# Patient Record
Sex: FEMALE | Race: WHITE | NOT HISPANIC OR LATINO | Employment: FULL TIME | URBAN - NONMETROPOLITAN AREA
[De-identification: names, ages, dates, MRNs, and addresses within clinical notes are randomized per-mention and may not be internally consistent; named-entity substitution may affect disease eponyms.]

---

## 2021-10-26 ENCOUNTER — OFFICE VISIT (OUTPATIENT)
Dept: FAMILY MEDICINE CLINIC | Facility: CLINIC | Age: 36
End: 2021-10-26

## 2021-10-26 ENCOUNTER — LAB (OUTPATIENT)
Dept: LAB | Facility: HOSPITAL | Age: 36
End: 2021-10-26

## 2021-10-26 VITALS
WEIGHT: 226 LBS | RESPIRATION RATE: 20 BRPM | HEART RATE: 69 BPM | DIASTOLIC BLOOD PRESSURE: 85 MMHG | HEIGHT: 63 IN | SYSTOLIC BLOOD PRESSURE: 138 MMHG | TEMPERATURE: 97.6 F | BODY MASS INDEX: 40.04 KG/M2

## 2021-10-26 DIAGNOSIS — F90.0 ATTENTION DEFICIT HYPERACTIVITY DISORDER (ADHD), PREDOMINANTLY INATTENTIVE TYPE: ICD-10-CM

## 2021-10-26 DIAGNOSIS — F90.0 ATTENTION DEFICIT HYPERACTIVITY DISORDER (ADHD), PREDOMINANTLY INATTENTIVE TYPE: Primary | ICD-10-CM

## 2021-10-26 LAB
AMPHET+METHAMPHET UR QL: NEGATIVE
AMPHETAMINES UR QL: NEGATIVE
BARBITURATES UR QL SCN: NEGATIVE
BENZODIAZ UR QL SCN: NEGATIVE
BUPRENORPHINE SERPL-MCNC: NEGATIVE NG/ML
CANNABINOIDS SERPL QL: NEGATIVE
COCAINE UR QL: NEGATIVE
METHADONE UR QL SCN: NEGATIVE
OPIATES UR QL: NEGATIVE
OXYCODONE UR QL SCN: NEGATIVE
PCP UR QL SCN: NEGATIVE
PROPOXYPH UR QL: NEGATIVE
TRICYCLICS UR QL SCN: NEGATIVE

## 2021-10-26 PROCEDURE — 99204 OFFICE O/P NEW MOD 45 MIN: CPT | Performed by: PEDIATRICS

## 2021-10-26 PROCEDURE — 80306 DRUG TEST PRSMV INSTRMNT: CPT

## 2021-10-26 RX ORDER — SPIRONOLACTONE 50 MG/1
50 TABLET, FILM COATED ORAL 2 TIMES DAILY
COMMUNITY

## 2021-10-26 NOTE — ASSESSMENT & PLAN NOTE
Evaluation packet reviewed and discussed with patient.  Patients symptoms are impairing her work performance, self-esteem and ability to have positive family kinetics.  We will start new medication pending negative UDS.  Lance reviewed and is clean.  Follow up in 1 month.Psychological condition is newly identified.  Medication changes per orders.  Psychological condition  will be reassessed in 4 weeks.    After negative uds vyvanse 40 will be started.    UDS negative.

## 2021-10-26 NOTE — PROGRESS NOTES
"Chief Complaint  ADHD (initial)    Subjective    History of Present Illness      Patient presents to [facility] for   Pt states that she is here for an initial ADHD evaluation.       ADHD/Mood HPI    Visit for:  initial evaluation  Interim changes to follow up on today: no change in medication  Work/School Performance:  struggling  Cognitive:  unable to focus    Behavior  Hyperactivity: is not hyperactive  Impulsivity: no impulsivity  Tasking: difficulty initiating tasks and difficulty completing tasks    Social  ADHD social/impulsive symptoms:  has difficulty awaiting turn    Behavioral health  Behavior: no concerns  Emotional coping: demonstrates feelings of no concerns       Review of Systems    I have reviewed and agree with the HPI information as above.  Dae Malcolm MD     Objective   Vital Signs:   /85   Pulse 69   Temp 97.6 °F (36.4 °C)   Resp 20   Ht 160 cm (63\")   Wt 103 kg (226 lb)   BMI 40.03 kg/m²       Physical Exam  Constitutional:       Appearance: Normal appearance. She is obese.   Cardiovascular:      Rate and Rhythm: Normal rate and regular rhythm.      Heart sounds: Normal heart sounds.   Pulmonary:      Effort: Pulmonary effort is normal.      Breath sounds: Normal breath sounds.   Neurological:      Mental Status: She is alert.   Psychiatric:         Mood and Affect: Mood and affect normal.         Behavior: Behavior normal.         Thought Content: Thought content normal.         Cognition and Memory: Cognition normal.          Result Review  Data Reviewed:        Urine Drug Screen - Urine, Clean Catch (10/26/2021 11:45) - Negative UDS             Assessment and Plan      Problem List Items Addressed This Visit        Mental Health    Attention deficit hyperactivity disorder (ADHD), predominantly inattentive type - Primary    Current Assessment & Plan     Evaluation packet reviewed and discussed with patient.  Patients symptoms are impairing her work " performance, self-esteem and ability to have positive family kinetics.  We will start new medication pending negative UDS.  Lance reviewed and is clean.  Follow up in 1 month.Psychological condition is newly identified.  Medication changes per orders.  Psychological condition  will be reassessed in 4 weeks.    After negative uds vyvanse 40 will be started.    UDS negative.         Relevant Orders    Urine Drug Screen - Urine, Clean Catch (Completed)              Follow Up   Return in about 4 weeks (around 11/23/2021) for Recheck.  Patient was given instructions and counseling regarding her condition or for health maintenance advice. Please see specific information pulled into the AVS if appropriate.

## 2021-11-22 ENCOUNTER — OFFICE VISIT (OUTPATIENT)
Dept: FAMILY MEDICINE CLINIC | Facility: CLINIC | Age: 36
End: 2021-11-22

## 2021-11-22 VITALS — RESPIRATION RATE: 20 BRPM | TEMPERATURE: 97.7 F | BODY MASS INDEX: 39.34 KG/M2 | HEIGHT: 63 IN | WEIGHT: 222 LBS

## 2021-11-22 DIAGNOSIS — F90.0 ATTENTION DEFICIT HYPERACTIVITY DISORDER (ADHD), PREDOMINANTLY INATTENTIVE TYPE: Primary | ICD-10-CM

## 2021-11-22 PROCEDURE — 99214 OFFICE O/P EST MOD 30 MIN: CPT | Performed by: NURSE PRACTITIONER

## 2021-11-22 NOTE — PROGRESS NOTES
"Chief Complaint  f/u ADHD    Subjective    History of Present Illness      Patient presents to [facility] for   Pt states that she is here for a f/u with ADHD and says the medication is wearing off early and would like to discuss increasing the Vyvanse.       ADHD/Mood HPI    Visit for:  follow-up. Most recent visit was 1 month ago.  Interim changes to follow up on today: medication dose change  Work/School Performance:  going well  Cognitive:  able to focus    Behavior  Hyperactivity: is not hyperactive  Impulsivity: no impulsivity  Tasking: able to mult-task    Social  ADHD social/impulsive symptoms:  not impatient    Behavioral health  Behavior: no concerns  Emotional coping: demonstrates feelings of no concerns         I have reviewed and agree with the HPI information as above.  Nu Zendejas, APRN     Objective   Vital Signs:   Temp 97.7 °F (36.5 °C)   Resp 20   Ht 160 cm (63\")   Wt 101 kg (222 lb)   BMI 39.33 kg/m²       Physical Exam  Vitals and nursing note reviewed.   Constitutional:       Appearance: Normal appearance. She is well-developed. She is obese.   HENT:      Head: Normocephalic and atraumatic.      Right Ear: Tympanic membrane, ear canal and external ear normal.      Left Ear: Tympanic membrane, ear canal and external ear normal.      Nose: Nose normal. No septal deviation, nasal tenderness or congestion.      Mouth/Throat:      Lips: Pink. No lesions.      Mouth: Mucous membranes are moist. No oral lesions.      Dentition: Normal dentition.      Pharynx: Oropharynx is clear. No pharyngeal swelling, oropharyngeal exudate or posterior oropharyngeal erythema.   Eyes:      General: Lids are normal. Vision grossly intact. No scleral icterus.        Right eye: No discharge.         Left eye: No discharge.      Extraocular Movements: Extraocular movements intact.      Conjunctiva/sclera: Conjunctivae normal.      Right eye: Right conjunctiva is not injected. "      Left eye: Left conjunctiva is not injected.      Pupils: Pupils are equal, round, and reactive to light.   Neck:      Thyroid: No thyroid mass.      Trachea: Trachea normal.   Cardiovascular:      Rate and Rhythm: Normal rate and regular rhythm.      Heart sounds: Normal heart sounds. No murmur heard.  No gallop.    Pulmonary:      Effort: Pulmonary effort is normal.      Breath sounds: Normal breath sounds and air entry. No wheezing, rhonchi or rales.   Musculoskeletal:         General: No tenderness or deformity. Normal range of motion.      Cervical back: Full passive range of motion without pain, normal range of motion and neck supple.      Thoracic back: Normal.      Right lower leg: No edema.      Left lower leg: No edema.   Skin:     General: Skin is warm and dry.      Coloration: Skin is not jaundiced.      Findings: No rash.   Neurological:      Mental Status: She is alert and oriented to person, place, and time.      Cranial Nerves: Cranial nerves are intact.      Sensory: Sensation is intact.      Motor: Motor function is intact.      Coordination: Coordination is intact.      Gait: Gait is intact.      Deep Tendon Reflexes: Reflexes are normal and symmetric.   Psychiatric:         Mood and Affect: Mood and affect normal.         Behavior: Behavior normal.         Judgment: Judgment normal.                 Assessment and Plan      Problem List Items Addressed This Visit        Mental Health    Attention deficit hyperactivity disorder (ADHD), predominantly inattentive type - Primary      Patient states that she can tell a positive difference but feels like it could be a little bit stronger. UDS is UTD and appropriate. Will increase the dose at this time.   Plan:  1. Increase vyvanse to 50 mg        Follow Up   Return in about 1 month (around 12/22/2021) for ADHD follow up.  Patient was given instructions and counseling regarding her condition or for health maintenance advice. Please see specific  information pulled into the AVS if appropriate.

## 2022-05-02 ENCOUNTER — TELEPHONE (OUTPATIENT)
Dept: BARIATRICS/WEIGHT MGMT | Facility: CLINIC | Age: 37
End: 2022-05-02

## 2022-05-02 NOTE — TELEPHONE ENCOUNTER
LVM to remind them of their new patient appointment and to bring completed paperwork or arrive 30 minutes early. Please call back with any questions

## 2022-05-03 ENCOUNTER — OFFICE VISIT (OUTPATIENT)
Dept: BARIATRICS/WEIGHT MGMT | Facility: CLINIC | Age: 37
End: 2022-05-03

## 2022-05-03 VITALS
SYSTOLIC BLOOD PRESSURE: 126 MMHG | HEIGHT: 63 IN | WEIGHT: 231.2 LBS | TEMPERATURE: 98.9 F | HEART RATE: 78 BPM | DIASTOLIC BLOOD PRESSURE: 86 MMHG | BODY MASS INDEX: 40.96 KG/M2 | OXYGEN SATURATION: 98 %

## 2022-05-03 DIAGNOSIS — E78.2 MIXED HYPERLIPIDEMIA: ICD-10-CM

## 2022-05-03 DIAGNOSIS — R06.09 DYSPNEA ON EXERTION: ICD-10-CM

## 2022-05-03 DIAGNOSIS — E66.01 CLASS 3 SEVERE OBESITY DUE TO EXCESS CALORIES WITH SERIOUS COMORBIDITY AND BODY MASS INDEX (BMI) OF 40.0 TO 44.9 IN ADULT: Primary | ICD-10-CM

## 2022-05-03 PROCEDURE — 99215 OFFICE O/P EST HI 40 MIN: CPT | Performed by: NURSE PRACTITIONER

## 2022-05-03 RX ORDER — HYDROCHLOROTHIAZIDE 25 MG/1
25 TABLET ORAL DAILY
COMMUNITY

## 2022-05-03 NOTE — PROGRESS NOTES
Patient Care Team:  Ryan Sams DO as PCP - General (Family Medicine)      Subjective     Patient is a 36 y.o. female presents with morbid obesity and her Body mass index is 41.28 kg/m².     She is here for discussion of surgical weight loss options.  She stated she has been with the disease of obesity for year(s).  She stated she suffers from pre diabetes, hyperlipidemia and morbid obesity due to her weight gain.  She stated that weight loss helps alleviate these symptoms.   She stated that she has tried other diet regimens and medications such as phentermine, diuretics and dexatrim to help with weight loss.  She stated that she has attempted these conservative methods for weight loss without maintaining long term success.  Today she would like to discuss surgical weight loss options such as the Laparoscopic Sleeve Gastrectomy or the Laparoscopic R - Y Gastric Bypass.     States that she is dealt with being overweight her whole life.  She states that she started dieting at age 13 and over the years she has been able to lose 20 pounds on a single diet and maintain it for 2 years.    Review of Systems   Constitutional: Positive for fatigue and unexpected weight gain.   Respiratory: Negative.    Cardiovascular: Negative.    Gastrointestinal: Negative.    Endocrine: Negative.    Genitourinary: Positive for frequency.   Musculoskeletal: Positive for joint swelling.   Psychiatric/Behavioral: Positive for sleep disturbance.        History  Past Medical History:   Diagnosis Date   • ADHD (attention deficit hyperactivity disorder)       Past Surgical History:   Procedure Laterality Date   •  SECTION      x 2      Social History     Socioeconomic History   • Marital status:    Tobacco Use   • Smoking status: Former Smoker     Quit date: 5/3/2002     Years since quittin.0   • Smokeless tobacco: Never Used   Vaping Use   • Vaping Use: Never used   Substance and Sexual Activity   • Alcohol use:  Never   • Drug use: Never   • Sexual activity: Defer      Family History   Problem Relation Age of Onset   • Obesity Mother    • Hypertension Mother    • Diabetes Mother    • Hypertension Father    • Sleep apnea Maternal Grandmother    • Heart disease Maternal Grandmother    • Hypertension Maternal Grandmother    • Cancer Maternal Grandfather    • Hypertension Paternal Grandfather    • Heart disease Paternal Grandfather    • Diabetes Paternal Grandfather       No Known Allergies       Current Outpatient Medications:   •  hydroCHLOROthiazide (HYDRODIURIL) 25 MG tablet, Take 25 mg by mouth Daily., Disp: , Rfl:   •  lisdexamfetamine (Vyvanse) 50 MG capsule, Take 1 capsule by mouth Every Morning, Disp: 30 capsule, Rfl: 0  •  spironolactone (ALDACTONE) 50 MG tablet, Take 50 mg by mouth 2 (Two) Times a Day., Disp: , Rfl:     Objective     Vital Signs  Temp:  [98.9 °F (37.2 °C)] 98.9 °F (37.2 °C)  Heart Rate:  [78] 78  BP: (126)/(86) 126/86  Body mass index is 41.28 kg/m².      05/03/22  1106   Weight: 105 kg (231 lb 3.2 oz)       Physical Exam  Vitals reviewed.   Constitutional:       Appearance: She is obese.   Cardiovascular:      Rate and Rhythm: Normal rate and regular rhythm.   Pulmonary:      Effort: Pulmonary effort is normal.   Abdominal:      General: Bowel sounds are normal.      Palpations: Abdomen is soft.   Musculoskeletal:         General: Normal range of motion.   Skin:     General: Skin is warm and dry.   Neurological:      Mental Status: She is alert and oriented to person, place, and time.   Psychiatric:         Mood and Affect: Mood normal.         Behavior: Behavior normal.            Results Review:   I reviewed the patient's new clinical results.      Class 3 Severe Obesity (BMI >=40). Obesity-related health conditions include the following: dyslipidemias. Obesity is unchanged. BMI is is above average; BMI management plan is completed. We discussed portion control and increasing  exercise.      Assessment/Plan   Diagnoses and all orders for this visit:    1. Class 3 severe obesity due to excess calories with serious comorbidity and body mass index (BMI) of 40.0 to 44.9 in adult (HCC) (Primary)  Assessment & Plan:  Patient's (Body mass index is 41.28 kg/m².) indicates that they are morbidly obese (BMI > 40 or > 35 with obesity - related health condition) with health conditions that include dyslipidemias . Weight is worsening. BMI is is above average; BMI management plan is completed. We discussed portion control, increasing exercise and consulting a Bariatric surgeon.     Orders:  -     Bariatric Nutritional Counseling  -     ECG 12 Lead; Future  -     XR Chest 2 View; Future  -     Bariatric Nutritional Counseling    2. Dyspnea on exertion  Comments:  Patient states that she has noticed this worsens with weight gain.  Chest x-ray and EKG ordered today.  Orders:  -     ECG 12 Lead; Future  -     XR Chest 2 View; Future    3. Mixed hyperlipidemia  Comments:  Not on medication, states she is working on diet to control cholesterol level.       She has been provided a structured dietary regimen based off of her behavior.  I discussed with the patient the etiology of the disease of obesity and the potential comorbid conditions associated with this disease.  She was instructed to follow the dietary regimen and follow-up with our program in 1 month's time with any additional questions as they may arise during this time.  We emphasized on focusing on proteins and meals high in fiber as well as adequate hydration that exceed 64 ounces of water daily.    I explained that I anticipate the patient to lose weight prior to her next monthly visit.  I encouraged patient to have a reward day once a month.    I discussed the patient's findings and my recommendations with patient.     I have also recommended that she obtain a cardiac risk assessment and psychiatric evaluation prior to surgery  consideration.    1. Patient is a 36 y.o. female who has morbid obesity with Body mass index is 41.28 kg/m². and desires surgical weight loss. Patient has been advised that this surgery is considered to be elective major surgery that is typically done laparoscopically. Patient is a potentially good surgical candidate. Patient will need to meet with the Bariatric Surgeon to further discuss surgical options. Patient has been advised that they will need to have a work-up prior to surgery. This work-up will include but is not limited to an EKG, an EGD to assess for H. Pylori and Celestin's esophagus, and a psychological evaluation, with additional testing as necessary. Pre-op testing will be ordered at next visit. Today the patient  received the 4 meals/day diet prescription, which was explained to patient.  Patient will see the dietitian today to further discuss goals for diet, exercise, and lifestyle. Patient has received intensive behavioral therapy for obesity today. I explained the pathophysiology of the disease and its storagte component. We also discussed Dr. Stevenson' pearls of the program. Nutrition counseling ordered today.     2. Current comorbid condition of  hyperlipidemia associated with her morbid obesity is reported to be stable on her current treatment regimen and medications. We anticipate the comorbid condition to improve as we address her morbid obesity.          Pre-op testing:     Seminar - Completed  EGD - Needed, but not yet ordered  H. Pylori - Will be done with EGD   H. Pylori Stool -  N/A    Psychological Evaluation - Needed, but not yet ordered    EKG - ORDERED   Cardiology - If EKG abnormal, cardiology will be ordered     TSH - Needed, but not yet ordered  Nicotine - N/A    Pulmonary Clearance - N/A   Drug Tests - N/A    Dietitian - Completed     A total of 45 minutes was spent face to face with this patient and over half of the time was spent on counseling and coordination of care for the  disease of obesity. We specifically reviewed the dietary prescription and I made recommendations toward increasing exercise as tolerated as well as focusing on training their behavior toward storing less.      RICA Topete     05/04/22  10:01 CDT

## 2022-05-04 NOTE — ASSESSMENT & PLAN NOTE
Patient's (Body mass index is 41.28 kg/m².) indicates that they are morbidly obese (BMI > 40 or > 35 with obesity - related health condition) with health conditions that include dyslipidemias . Weight is worsening. BMI is is above average; BMI management plan is completed. We discussed portion control, increasing exercise and consulting a Bariatric surgeon.

## 2022-05-17 ENCOUNTER — OFFICE VISIT (OUTPATIENT)
Dept: BARIATRICS/WEIGHT MGMT | Facility: CLINIC | Age: 37
End: 2022-05-17

## 2022-05-17 VITALS — HEIGHT: 63 IN | BODY MASS INDEX: 41.6 KG/M2 | WEIGHT: 234.8 LBS

## 2022-05-17 NOTE — PROGRESS NOTES
"Metabolic and Bariatric Surgery Adult Nutrition Assessment    Patient Name: Jade Lam   YOB: 1985   MRN: [id number]     Assessment Date:  2022     Reason for Visit: Initial Nutrition Assessment     Treatment Pathway: Preoperative Bariatric Surgery, Visit 1    Assessment    Anthropometrics   Wt Readings from Last 1 Encounters:   22 107 kg (234 lb 12.8 oz)     Ht Readings from Last 1 Encounters:   22 159.4 cm (62.75\")     BMI Readings from Last 1 Encounters:   22 41.93 kg/m²        Initial Weight/Date: 231.2 (5/3/22)  Weight Changes since last visit: +3.6 lbs  Net Weight Change: +3.6 lbs    Past Medical History:   Diagnosis Date   • ADHD (attention deficit hyperactivity disorder)       Past Surgical History:   Procedure Laterality Date   •  SECTION      x 2      Current Outpatient Medications   Medication Sig Dispense Refill   • hydroCHLOROthiazide (HYDRODIURIL) 25 MG tablet Take 25 mg by mouth Daily.     • lisdexamfetamine (Vyvanse) 50 MG capsule Take 1 capsule by mouth Every Morning 30 capsule 0   • spironolactone (ALDACTONE) 50 MG tablet Take 50 mg by mouth 2 (Two) Times a Day.       No current facility-administered medications for this visit.      No Known Allergies     Motivation for weight loss includes:  Just be healthier, reduce joint swelling. Reduce heart disease/diabetes risk factors. Overall likes vegetables and family likes vegetables.     Pertinent Social/Behavior/Environmental History: Gets up ~7am. 12 hr shifts 3 days/wk. Spouse and son do enjoy to cook.      Nutrition Recall  Eating 3-4 meals daily   (M1) usually doesn't eat until 11am/12pm sausage eggs  (M2) n/a  (M3) supper 5:30/6pm Cabbage, turkey polish sausage, cornbread, corn cob OR pizza night OR taco night (ordered out).   (M4) eats again after at 8/9 pm - usually desires sweets/ice cream. Likes r/t stress relief/comfort eating.   Snacking - eating right before bed. Eating own food and " watching TV show.   Monitoring portions- not currently  Calculating Protein- not currently  Drinking sugary/carbonated beverages- no  Fluid Intake- half to gallon water bottle.       Barriers: Has been eating out more since school is out. Absolutely hates cooking.     Exercise: limited at this time; is considering getting an exercise bike at home.      Nutrition Intervention  Nutrition education and nutrition coaching for behavior change provided.  Strategies used included Comprehensive education, Motivational Interviewing , Problem Solving, Skill Development for meal planning, reducing stress/comfort eating, habit change to eliminate using food as reward system for family, Ongoing reinforcement, Provided sample menus and Provided samples of protein supplement   Review of medical weight loss prescription 4 meal/day plan and reviewed nutritional needs for Preoperative Bariatric Surgery, Visit 1.    Diet Changes  Eat 4 meals per day with protein and vegetables at each meal, no carbs after meal 2., Protein goal: 65 gms., Eat vegetables first at each meal., Discussed protein guidelines for shakes and bars., Reduce snacking -use foods from free foods list only., Eliminate snacks., Reduce fat, sugar, and/or salt in food choices., Choose more nutrient dense foods., Choose foods with increased fiber., Monitor portion sizes using a food scale and/or measuring cup., Eliminate soda and sugar-sweetened beverages and Increase fluid intake to 64 ounces per day      Goals  1. Write out a list of 5 Sheet pan dinners that sound good to utilize as family meals.  2. Set timers to have 4 specific meal times daily.   3. Eliminate Group C foods after meal 2.     Self-monitoring strategies such as keeping a food journal (on paper or electronically) and calculating fluid/protein intake were discussed.    Monitoring/Evaluation Plan  Anticipate follow up per program protocol. Continue collaboration of care with physician and treatment team.      Electronically signed by  Mamie Stubbs RDN, LD  05/17/2022 13:39 CDT.

## 2022-08-30 ENCOUNTER — OFFICE VISIT (OUTPATIENT)
Dept: BARIATRICS/WEIGHT MGMT | Facility: CLINIC | Age: 37
End: 2022-08-30

## 2022-08-30 VITALS
TEMPERATURE: 98 F | HEART RATE: 72 BPM | OXYGEN SATURATION: 98 % | HEIGHT: 63 IN | BODY MASS INDEX: 41.96 KG/M2 | SYSTOLIC BLOOD PRESSURE: 120 MMHG | DIASTOLIC BLOOD PRESSURE: 85 MMHG | WEIGHT: 236.8 LBS

## 2022-08-30 DIAGNOSIS — E66.01 CLASS 3 SEVERE OBESITY DUE TO EXCESS CALORIES WITH SERIOUS COMORBIDITY AND BODY MASS INDEX (BMI) OF 40.0 TO 44.9 IN ADULT: Primary | ICD-10-CM

## 2022-08-30 DIAGNOSIS — G47.10 HYPERSOMNIA: ICD-10-CM

## 2022-08-30 DIAGNOSIS — E78.2 MIXED HYPERLIPIDEMIA: ICD-10-CM

## 2022-08-30 DIAGNOSIS — R06.83 SNORING: ICD-10-CM

## 2022-08-30 PROCEDURE — 99214 OFFICE O/P EST MOD 30 MIN: CPT | Performed by: NURSE PRACTITIONER

## 2022-08-30 NOTE — ASSESSMENT & PLAN NOTE
Patient's (Body mass index is 42.28 kg/m².) indicates that they are morbidly obese (BMI > 40 or > 35 with obesity - related health condition) with health conditions that include dyslipidemias . Weight is worsening. BMI is is above average; BMI management plan is completed. We discussed portion control and increasing exercise.

## 2022-08-30 NOTE — PROGRESS NOTES
"Metabolic and Bariatric Surgery Adult Nutrition Assessment    Patient Name: Jade Lam   YOB: 1985   MRN: [id number]     Assessment Date:  2022     Reason for Visit: Follow-up Nutrition Assessment     Treatment Pathway: Preoperative Bariatric Surgery, Visit 2    Assessment    Anthropometrics   Wt Readings from Last 1 Encounters:   22 107 kg (236 lb 12.8 oz)     Ht Readings from Last 1 Encounters:   22 159.4 cm (62.75\")     BMI Readings from Last 1 Encounters:   22 42.28 kg/m²        Initial Weight/Date: 231.2 lbs (5/3/22)  Weight Changes since last visit: +5.6 lbs  Net Weight Change: +5.6 lbs    Past Medical History:   Diagnosis Date   • ADHD (attention deficit hyperactivity disorder)       Past Surgical History:   Procedure Laterality Date   •  SECTION      x 2      Current Outpatient Medications   Medication Sig Dispense Refill   • hydroCHLOROthiazide (HYDRODIURIL) 25 MG tablet Take 25 mg by mouth Daily.     • lisdexamfetamine (Vyvanse) 50 MG capsule Take 1 capsule by mouth Every Morning 30 capsule 0   • spironolactone (ALDACTONE) 50 MG tablet Take 50 mg by mouth 2 (Two) Times a Day.       No current facility-administered medications for this visit.      No Known Allergies       Motivation for weight loss includes: Wants to be healthier. Reduce swelling and blood pressure. Reduce risk of diabetes (last HgbA1C reported is 6.1%)    Pertinent Social/Behavior/Environmental History: Currently working nights. Spouse does most of the cooking and meal prep. Sometimes does eat a supper meal with family prior to work.     Nutrition Recall  Eating 3-4 meals daily   (M1) 3 pm Sonic cheeseburger with order of nataly. Sticks b/c couldn't wait for supper.   (M2) 11 pm at work pinni noodles with spaghetti meat and sauce. x2 pieces of [place] toast.   (M3) snack- three pickles. Bites of fruit.   (M4) n/a today.   Snacking - pickles, fruit.   Monitoring portions- No  Calculating " Protein- No  Drinking sugary/carbonated beverages- no  Fluid Intake- water (1/2 gallon daily) and black coffee (~2 cups in the mornings)    Barriers: With kids home from school for summer, life was busy and did not comply to meal plan.     Exercise: limited to none.       Nutrition Intervention  Nutrition education and nutrition coaching for behavior change provided.  Strategies used included Comprehensive education, Motivational Interviewing , Problem Solving, Skill Development for meal planning and measuring portions , Ongoing reinforcement, Provided sample menus and Provided samples of protein  supplement   Review of medical weight loss prescription 4 meal/day plan and reviewed nutritional needs for Preoperative Bariatric Surgery, Visit 2.  Self-monitoring strategies such as keeping a food journal (on paper or electronically) and calculating fluid/protein intake were discussed.    Recommended Diet Changes  Eat 4 meals per day with protein and vegetables at each meal, no carbs after meal 2., Protein goal: 65 gms., Eat vegetables first at each meal., Discussed protein guidelines for shakes and bars. and Monitor portion sizes using a food scale and/or measuring cup.      Goals  1. Set four meal times daily.   2. Every day at 3pm plan for the next day of meals.   3. Look at meal plan before every meal and measure portions size accordingly.     Monitoring/Evaluation Plan  Anticipate follow up per program protocol. Continue collaboration of care with physician and treatment team.     Electronically signed by  Mamie Stubbs RDN, LD  08/30/2022 08:37 CDT.

## 2022-08-30 NOTE — PROGRESS NOTES
"Patient Care Team:  Ryan Sams DO as PCP - General (Family Medicine)    Reason for Visit:  Surgical Weight loss    Subjective   Jade Lam is a 36 y.o. female.     Jade is here for follow-up and continued medical management of her morbid obesity.  She is currently on a prescription diet.  Jade previously was to apply dietary changes such as following the meal plan as directed.  She admits to not following the prescription meal plan and eating off and on all day.  As a result she gained weight since her last visit.  She states that she is drinking half of a gallon of fluid each day.  She is exercising weekly by walking for 30-minute intervals.  She denies nicotine use and denies soda intake.  She states due to a busy lifestyle she has struggled with following the prescription meal plan.    Review Of Systems:  Review of Systems   Constitutional: Negative.    Respiratory: Negative.    Cardiovascular: Negative.    Gastrointestinal: Negative.    Endocrine: Negative.    Genitourinary: Positive for frequency.   Musculoskeletal: Positive for back pain.   Psychiatric/Behavioral: Negative.          The following portions of the patient's history were reviewed and updated as appropriate: allergies, current medications, past family history, past medical history, past social history, past surgical history, and problem list.    Objective   /85 (BP Location: Right arm, Patient Position: Sitting, Cuff Size: Adult)   Pulse 72   Temp 98 °F (36.7 °C)   Ht 159.4 cm (62.75\")   Wt 107 kg (236 lb 12.8 oz)   SpO2 98%   BMI 42.28 kg/m²       08/30/22  0825   Weight: 107 kg (236 lb 12.8 oz)       Physical Exam  Vitals reviewed.   Constitutional:       Appearance: She is obese.   Cardiovascular:      Rate and Rhythm: Normal rate and regular rhythm.   Pulmonary:      Effort: Pulmonary effort is normal.   Abdominal:      General: Bowel sounds are normal.      Palpations: Abdomen is soft.   Musculoskeletal:       "   General: Normal range of motion.   Skin:     General: Skin is warm and dry.   Neurological:      Mental Status: She is alert and oriented to person, place, and time.   Psychiatric:         Mood and Affect: Mood normal.         Behavior: Behavior normal.         Class 3 Severe Obesity (BMI >=40). Obesity-related health conditions include the following: dyslipidemias. Obesity is unchanged. BMI is is above average; BMI management plan is completed. We discussed portion control and increasing exercise.     Assessment & Plan   Diagnoses and all orders for this visit:    1. Class 3 severe obesity due to excess calories with serious comorbidity and body mass index (BMI) of 40.0 to 44.9 in adult (HCC) (Primary)  Assessment & Plan:  Patient's (Body mass index is 42.28 kg/m².) indicates that they are morbidly obese (BMI > 40 or > 35 with obesity - related health condition) with health conditions that include dyslipidemias . Weight is worsening. BMI is is above average; BMI management plan is completed. We discussed portion control and increasing exercise.     Orders:  -     Ambulatory Referral to Psychiatry  -     ECG 12 Lead; Future  -     Home Sleep Study; Future    2. Mixed hyperlipidemia  Comments:  Not currently on medication.  Nutritional counseling provided.    3. Snoring  Comments:  Sleep study discussed and ordered.  Orders:  -     Home Sleep Study; Future    4. Hypersomnia  Comments:  [place] scale complete, neck circumference measured   Orders:  -     Home Sleep Study; Future       Jade Lam was seen today for follow-up, obesity, nutrition counseling and weight loss.  She has lost weight since her last visit.  Today we discussed healthy changes in lifestyle, diet, and exercise. Dietician consultation obtained.  Jade Lam had received handouts to her explaining the recommendation on portion sizes/appetite control/reading nutrition labels.   Intensive behavioral therapy for obesity was done today as  "well.       Richards Sleepiness Scale (ESS)  Rate situations associated with sleepiness:      Sitting and reading      No chance of dozing (0 points)     Slight chance of dozing (1 point)     Moderate chance of dozing (2 points)   3  High chance of dozing (3 points)   Watching television      No chance of dozing (0 points)     Slight chance of dozing (1 point)     Moderate chance of dozing (2 points)   3  High chance of dozing (3 points)   Sitting inactive in a public place    0  No chance of dozing (0 points)     Slight chance of dozing (1 point)     Moderate chance of dozing (2 points)     High chance of dozing (3 points)   Sitting for an hour as a passenger in a car      No chance of dozing (0 points)     Slight chance of dozing (1 point)     Moderate chance of dozing (2 points)   3  High chance of dozing (3 points)   Lying down in the afternoon to rest      No chance of dozing (0 points)     Slight chance of dozing (1 point)     Moderate chance of dozing (2 points)   3  High chance of dozing (3 points)   Sitting and talking to another person    0  No chance of dozing (0 points)     Slight chance of dozing (1 point)     Moderate chance of dozing (2 points)     High chance of dozing (3 points)   Sitting quietly after a lunch (no alcohol at lunch)      No chance of dozing (0 points)   1  Slight chance of dozing (1 point)     Moderate chance of dozing (2 points)     High chance of dozing (3 points)   Sitting in a car, stopped for a few minutes due to traffic    0  No chance of dozing (0 points)     Slight chance of dozing (1 point)     Moderate chance of dozing (2 points)     High chance of dozing (3 points)         Total Criteria Point Count:13  Neck Circumference: 16.25\"       Goals for this month are:   1.  Patient met with dietitian today.  Nutritional goals discussed and set.  Patient has been encouraged to follow prescription meal plan more closely.  2.  Plan meals at least 1 day in advance.  3.  EKG " ordered.  4.  Sleep study discussed and ordered.  5.  Psychiatric evaluation ordered.    Follow up in one month for a weight recheck.

## 2022-09-26 ENCOUNTER — PATIENT ROUNDING (BHMG ONLY) (OUTPATIENT)
Dept: BARIATRICS/WEIGHT MGMT | Facility: CLINIC | Age: 37
End: 2022-09-26

## 2022-09-26 ENCOUNTER — OFFICE VISIT (OUTPATIENT)
Dept: BARIATRICS/WEIGHT MGMT | Facility: CLINIC | Age: 37
End: 2022-09-26

## 2022-09-26 ENCOUNTER — HOSPITAL ENCOUNTER (OUTPATIENT)
Dept: SLEEP MEDICINE | Facility: HOSPITAL | Age: 37
Discharge: HOME | End: 2022-09-26
Admitting: NURSE PRACTITIONER

## 2022-09-26 VITALS
WEIGHT: 232 LBS | BODY MASS INDEX: 41.11 KG/M2 | DIASTOLIC BLOOD PRESSURE: 90 MMHG | TEMPERATURE: 98 F | SYSTOLIC BLOOD PRESSURE: 130 MMHG | HEART RATE: 84 BPM | HEIGHT: 63 IN | OXYGEN SATURATION: 98 %

## 2022-09-26 DIAGNOSIS — R06.83 SNORING: ICD-10-CM

## 2022-09-26 DIAGNOSIS — G47.10 HYPERSOMNIA: ICD-10-CM

## 2022-09-26 DIAGNOSIS — E78.2 MIXED HYPERLIPIDEMIA: ICD-10-CM

## 2022-09-26 DIAGNOSIS — E66.01 CLASS 3 SEVERE OBESITY DUE TO EXCESS CALORIES WITH SERIOUS COMORBIDITY AND BODY MASS INDEX (BMI) OF 40.0 TO 44.9 IN ADULT: ICD-10-CM

## 2022-09-26 DIAGNOSIS — E66.01 CLASS 3 SEVERE OBESITY DUE TO EXCESS CALORIES WITH SERIOUS COMORBIDITY AND BODY MASS INDEX (BMI) OF 40.0 TO 44.9 IN ADULT: Primary | ICD-10-CM

## 2022-09-26 DIAGNOSIS — Z87.898 HISTORY OF HEARTBURN: ICD-10-CM

## 2022-09-26 PROCEDURE — 95800 SLP STDY UNATTENDED: CPT

## 2022-09-26 PROCEDURE — 99214 OFFICE O/P EST MOD 30 MIN: CPT | Performed by: SURGERY

## 2022-09-26 NOTE — PROGRESS NOTES
"Metabolic and Bariatric Surgery Adult Nutrition Assessment    Patient Name: Jade Lam   YOB: 1985   MRN: [id number]     Assessment Date:  2022     Reason for Visit: Follow-up Nutrition Assessment     Treatment Pathway: Preoperative Bariatric Surgery, Visit 3    Assessment    Anthropometrics   Wt Readings from Last 1 Encounters:   22 105 kg (232 lb)     Ht Readings from Last 1 Encounters:   22 159.4 cm (62.75\")     BMI Readings from Last 1 Encounters:   22 41.43 kg/m²        Initial Weight/Date: 231.2 lbs (May 2022)  Weight Changes since last visit: -4.8 lbs  Net Weight Change: +0.8 lbs    Past Medical History:   Diagnosis Date   • ADHD (attention deficit hyperactivity disorder)       Past Surgical History:   Procedure Laterality Date   •  SECTION      x 2      Current Outpatient Medications   Medication Sig Dispense Refill   • hydroCHLOROthiazide (HYDRODIURIL) 25 MG tablet Take 25 mg by mouth Daily.     • lisdexamfetamine (Vyvanse) 50 MG capsule Take 1 capsule by mouth Every Morning 30 capsule 0   • spironolactone (ALDACTONE) 50 MG tablet Take 50 mg by mouth 2 (Two) Times a Day.       No current facility-administered medications for this visit.      No Known Allergies       Motivation for weight loss includes:  Overall to be healthier, reduce joint swelling. Reduce heart disease/diabetes risk factors.     Pertinent Social/Behavior/Environmental History: Is working night shift.     Nutrition Recall  Eating 4 meals daily     Success this Month: Improved schedule since kids are back in school; has eaten out less and is making a conscious effort to pack lunch daily. Is planning meals for the next day daily at 3pm (goal that was set last month).    Exercise: minimal, would like to improve this month.  Recommended increasing physical activity, beyond normal daily habits, gradually working to reach ~30 minutes daily.     Nutrition Intervention  Nutrition education and " nutrition coaching for behavior change provided.  Strategies used included Ongoing reinforcement  Review of medical weight loss prescription 4 meal/day plan and reviewed nutritional needs for Preoperative Bariatric Surgery, Visit 3.  Self-monitoring strategies such as keeping a food journal (on paper or electronically) and calculating fluid/protein intake were discussed.    Recommended Diet Changes  Eat 4 meals per day with protein and vegetables at each meal, no carbs after meal 2., Protein goal: 65gms., Eat vegetables first at each meal., Reduce snacking -use foods from free foods list only., Monitor portion sizes using a food scale and/or measuring cup., Eliminate soda and sugar-sweetened beverages and Increase fluid intake to 64 ounces per day      Goals, continue prior goals  1. Set four meal times daily.   2. Every day at 3pm plan for the next day of meals.   3. Look at meal plan before every meal and measure portions size accordingly.   4. Spend 5-10 minutes before bed stretching and creating habit to increase physical activity.     Monitoring/Evaluation Plan  Anticipate follow up per program protocol. Continue collaboration of care with physician and treatment team.     Electronically signed by  Mamie Stubbs RDN, LD  09/26/2022 08:29 CDT.

## 2022-09-26 NOTE — PROGRESS NOTES
September 26, 2022    Hello, may I speak with Jade Lam?    My name is Buffy       I am  with [facility]  [address], [address]  [facility] [address] [phone].    Before we get started may I verify your date of birth? 1985    I am calling to officially welcome you to our practice and ask about your recent visit. Is this a good time to talk? Left message for a return call.    Tell me about your visit with us. What things went well?  Left message for a return call.       We're always looking for ways to make our patients' experiences even better. Do you have recommendations on ways we may improve? Left message for a return call.    Overall were you satisfied with your first visit to our practice? Left message for a return call.       I appreciate you taking the time to speak with me today. Is there anything else I can do for you? Left message for a return call.      Thank you, and have a great day.

## 2022-09-26 NOTE — PROGRESS NOTES
Patient Care Team:  Ryan Sams DO as PCP - General (Family Medicine)    Reason for Visit:  Surgical Weight loss      Subjective     Jade Lam is a pleasant 36 y.o. female and presents with morbid obesity with her Body mass index is 41.43 kg/m².    She is here for discussion of the upper endoscopic procedure.  She stated she has been with the disease of obesity for year(s).  She stated she suffers from hyper lipidemia, history of heartburn and morbid obesity due to her weight gain.  She stated that weight loss helps alleviate these symptoms.   She stated that she has tried multiple diet regimens to help with weight loss.  She stated that she has attempted these conservative methods for weight loss without maintaining long term success.  Today sheand myself will discuss surgical weight loss options such as the Laparoscopic Sleeve Gastrectomy or the Laparoscopic R - Y Gastric Bypass.        Review of Systems  General ROS: positive for  - fatigue and sleep disturbance  Respiratory ROS: no cough, shortness of breath, or wheezing  Cardiovascular ROS: no chest pain or dyspnea on exertion  Gastrointestinal ROS: no abdominal pain, change in bowel habits, or black or bloody stools    History  Past Medical History:   Diagnosis Date   • ADHD (attention deficit hyperactivity disorder)      Past Surgical History:   Procedure Laterality Date   •  SECTION      x 2     Family History   Problem Relation Age of Onset   • Obesity Mother    • Hypertension Mother    • Diabetes Mother    • Hypertension Father    • Sleep apnea Maternal Grandmother    • Heart disease Maternal Grandmother    • Hypertension Maternal Grandmother    • Cancer Maternal Grandfather    • Hypertension Paternal Grandfather    • Heart disease Paternal Grandfather    • Diabetes Paternal Grandfather      Social History     Tobacco Use   • Smoking status: Former Smoker     Quit date: 5/3/2002     Years since quittin.4   • Smokeless  tobacco: Never Used   Vaping Use   • Vaping Use: Never used   Substance Use Topics   • Alcohol use: Never   • Drug use: Never     E-cigarette/Vaping   • E-cigarette/Vaping Use Never User      E-cigarette/Vaping Substances     (Not in a hospital admission)    Allergies:  Patient has no known allergies.      Current Outpatient Medications:   •  hydroCHLOROthiazide (HYDRODIURIL) 25 MG tablet, Take 25 mg by mouth Daily., Disp: , Rfl:   •  spironolactone (ALDACTONE) 50 MG tablet, Take 50 mg by mouth 2 (Two) Times a Day., Disp: , Rfl:   •  lisdexamfetamine (Vyvanse) 50 MG capsule, Take 1 capsule by mouth Every Morning, Disp: 30 capsule, Rfl: 0    Objective     Vital Signs  Temp:  [98 °F (36.7 °C)] 98 °F (36.7 °C)  Heart Rate:  [84] 84  BP: (130)/(90) 130/90  Body mass index is 41.43 kg/m².      09/26/22  0808   Weight: 105 kg (232 lb)       Physical Exam:      HEENT: extra ocular movement intact and sclera clear, anicteric  Respiratory: appears well, vitals normal, no respiratory distress, acyanotic, normal RR, chest clear, no wheezing, crepitations, rhonchi, normal symmetric air entry  Cardiovascular: Regular rate and rhythm, S1, S2 normal, no murmur, click, rub or gallop  GI: Soft, non-tender, normal bowel sounds; no bruits, organomegaly or masses.  Abnormal shape: obese  Musculoskeletal: inspection - no abnormality  Neurologic: alert, oriented, normal speech, no focal findings or movement disorder noted       Results Review:   None        Assessment & Plan   Encounter Diagnoses   Name Primary?   • Class 3 severe obesity due to excess calories with serious comorbidity and body mass index (BMI) of 40.0 to 44.9 in adult (HCC) Yes   • Mixed hyperlipidemia    • History of heartburn            1.  I believe this patient will be a good candidate for weight loss surgery.  I have discussed the Marisa - Y Gastric Bypass, laparoscopic sleeve gastrectomy and the Laparoscopic Gastric Band procedures.  We discussed the benefits of the  surgeries including the benefit of weight loss and the possible reversal of co-morbid conditions associated with morbid obesity. I explained to the patient that prior to making a definitive decision on the type of surgery she will require an esophagogastroduodenoscopy with biopsies to assess for any contraindications for surgical weight loss.  I explained the alternatives  include not doing anything, or pursuing an UGI series which only offers a diagnosis with potential less accuracy compared to EGD, the benefits of the EGD such as identifying the pathology and anatomy of the upper GI system, and the risks and complications of the endoscopy were discussed in detail as well. I discussed the risk of perforation (one out of 7651-9006, riskier with dilation), bleeding (one out of 500), and the rare risks of infection, adverse reaction to anesthesia, respiratory failure, cardiac failure including MI and adverse reaction to medications such as allergic reactions. We discussed consequences that could occur if a risk were to develop such as the need for hospitalization, blood transfusion, surgical intervention, medications, pain, disability and death. The patient verbalizes understanding and agrees to proceed. such as bleeding, perforation, swallowing difficulties and gas bloat can occur after this procedure.    Upon completion of our discussion and addressing and answering her questions to her satisfation, informed consent was obtained.  She will be scheduled accordingly for the esophagogastroduodenoscopy procedure.    I discussed the patients findings and my recommendations with patient.     Dr. Corky Stevenson MD [facility]    09/26/22  08:52 CDT  Patient Care Team:  Ryan Sams DO as PCP - General (Family Medicine)

## 2022-09-27 PROCEDURE — 95800 SLP STDY UNATTENDED: CPT | Performed by: INTERNAL MEDICINE

## 2022-10-14 DIAGNOSIS — G47.33 OSA (OBSTRUCTIVE SLEEP APNEA): Primary | ICD-10-CM

## 2022-10-20 ENCOUNTER — TELEPHONE (OUTPATIENT)
Dept: NEUROLOGY | Facility: HOSPITAL | Age: 37
End: 2022-10-20

## 2022-10-20 NOTE — TELEPHONE ENCOUNTER
Spoke to Ms. Lam and went over the results of her HST performed 09/27/2022.  Questions answered.  Dr. Don Medeiros wrote orders for an APAP.  Orders, documentation and insurance information was sent to [facility] on 10/17/2022. [facility] will be contacting Ms. Lam for a compliance appointment with Dr. Medeiros.

## 2022-10-21 ENCOUNTER — OFFICE VISIT (OUTPATIENT)
Dept: BARIATRICS/WEIGHT MGMT | Facility: CLINIC | Age: 37
End: 2022-10-21

## 2022-10-21 VITALS
HEIGHT: 63 IN | OXYGEN SATURATION: 98 % | SYSTOLIC BLOOD PRESSURE: 129 MMHG | BODY MASS INDEX: 41.5 KG/M2 | DIASTOLIC BLOOD PRESSURE: 87 MMHG | WEIGHT: 234.2 LBS | HEART RATE: 82 BPM | TEMPERATURE: 98.2 F

## 2022-10-21 DIAGNOSIS — E78.2 MIXED HYPERLIPIDEMIA: ICD-10-CM

## 2022-10-21 DIAGNOSIS — E66.01 CLASS 3 SEVERE OBESITY DUE TO EXCESS CALORIES WITH SERIOUS COMORBIDITY AND BODY MASS INDEX (BMI) OF 40.0 TO 44.9 IN ADULT: Primary | ICD-10-CM

## 2022-10-21 PROCEDURE — 99213 OFFICE O/P EST LOW 20 MIN: CPT | Performed by: NURSE PRACTITIONER

## 2022-10-21 NOTE — PROGRESS NOTES
"Patient Care Team:  Ryan Sams DO as PCP - General (Family Medicine)    Reason for Visit:  Surgical Weight loss, V4    Subjective   Jade Lam is a 36 y.o. female.     Jade is here for follow-up and continued medical management of her morbid obesity.  She is currently on a prescription diet.  Jade previously was to apply dietary changes such as following the meal plan as directed.  She admits to eating .  As a result she gained weight since her last visit.    She has gained 2 lbs since her last appointment with us.     Review Of Systems:  Review of Systems   Constitutional: Positive for fatigue.   Respiratory: Negative.    Cardiovascular: Negative.    Gastrointestinal: Negative.    Endocrine: Negative.    Musculoskeletal: Negative.    Psychiatric/Behavioral: Negative.          The following portions of the patient's history were reviewed and updated as appropriate: allergies, current medications, past family history, past medical history, past social history, past surgical history, and problem list.    Objective   /87 (BP Location: Right arm, Patient Position: Sitting, Cuff Size: Adult)   Pulse 82   Temp 98.2 °F (36.8 °C)   Ht 159.4 cm (62.75\")   Wt 106 kg (234 lb 3.2 oz)   SpO2 98%   BMI 41.82 kg/m²       10/21/22  0910   Weight: 106 kg (234 lb 3.2 oz)       Physical Exam  Vitals reviewed.   Constitutional:       Appearance: She is obese.   Cardiovascular:      Rate and Rhythm: Normal rate and regular rhythm.   Pulmonary:      Effort: Pulmonary effort is normal.   Skin:     General: Skin is warm and dry.   Neurological:      Mental Status: She is alert and oriented to person, place, and time.   Psychiatric:         Mood and Affect: Mood normal.         Behavior: Behavior normal.         Class 3 Severe Obesity (BMI >=40). Obesity-related health conditions include the following: hypertension, diabetes mellitus and dyslipidemias. Obesity is improving with treatment. BMI is is above " average; BMI management plan is completed. We discussed portion control and increasing exercise.     Assessment & Plan   Diagnoses and all orders for this visit:    1. Class 3 severe obesity due to excess calories with serious comorbidity and body mass index (BMI) of 40.0 to 44.9 in adult (HCC) (Primary)  Assessment & Plan:  Patient's (Body mass index is 41.82 kg/m².) indicates that they are morbidly obese (BMI > 40 or > 35 with obesity - related health condition) with health conditions that include hypertension, diabetes mellitus and dyslipidemias . Weight is worsening. BMI is is above average; BMI management plan is completed. We discussed portion control and increasing exercise.       2. Mixed hyperlipidemia   Home Sleep Study (09/30/2022 08:11)  ECG 12 Lead (08/30/2022 09:12)  TSH (09/26/2022 08:47)    Jade Lam was seen today for follow-up, obesity, nutrition counseling and weight loss.  She has gained weight since her last visit.  Today we discussed healthy changes in lifestyle, diet, and exercise. Dietician consultation obtained.  Jade Lam had received handouts to her explaining the recommendation on portion sizes/appetite control/reading nutrition labels.   Intensive behavioral therapy for obesity was done today as well.     Goals for this month are:   1.  Patient encouraged to continue working on following prescription meal plan.  I encouraged patient to watch for any type of grazing or snacking.  Overall patient appears to be doing well.  Patient did have a 2 pound weight gain since her last appointment.  She has been encouraged to log meals.  Patient will see Dr. Stevenson in 1 month to discuss proceeding with a sleeve gastrectomy.  2.  Patient states that she had her EKG done at [facility].  3.  Patient has second appointment with psychiatry scheduled.  Follow up in one month for a weight recheck.A total of 20 minutes was spent face to face with this patient and over half of the time was  spent on counseling and coordination of care for the disease of obesity. We specifically reviewed the dietary prescription and I made recommendations toward increasing exercise as tolerated as well as focusing on training their behavior toward storing less.

## 2022-10-24 NOTE — ASSESSMENT & PLAN NOTE
Patient's (Body mass index is 41.82 kg/m².) indicates that they are morbidly obese (BMI > 40 or > 35 with obesity - related health condition) with health conditions that include hypertension, diabetes mellitus and dyslipidemias . Weight is worsening. BMI is is above average; BMI management plan is completed. We discussed portion control and increasing exercise.

## 2022-11-02 ENCOUNTER — TELEPHONE (OUTPATIENT)
Dept: BARIATRICS/WEIGHT MGMT | Facility: CLINIC | Age: 37
End: 2022-11-02

## 2022-11-02 NOTE — TELEPHONE ENCOUNTER
BA-EGD       Patient was called and reminded of their procedure with Dr Stevenson on 11/04/2022        Instructions:     1) Eat normal the day before your procedure until 8 p.m. in the evening.    2) Beginning at 8pm clear liquids only-no Red or Pink in Color   3) Nothing to eat or drink after midnight.  4) Bring a list of medications.   5) Advised that they must bring a  as that IV sedation is being used.           The patient voiced an understanding and was agreeable.

## 2022-11-04 ENCOUNTER — ANESTHESIA EVENT (OUTPATIENT)
Dept: GASTROENTEROLOGY | Facility: HOSPITAL | Age: 37
End: 2022-11-04

## 2022-11-04 ENCOUNTER — HOSPITAL ENCOUNTER (OUTPATIENT)
Facility: HOSPITAL | Age: 37
Setting detail: HOSPITAL OUTPATIENT SURGERY
Discharge: HOME | End: 2022-11-04
Attending: SURGERY | Admitting: SURGERY

## 2022-11-04 ENCOUNTER — ANESTHESIA (OUTPATIENT)
Dept: GASTROENTEROLOGY | Facility: HOSPITAL | Age: 37
End: 2022-11-04

## 2022-11-04 VITALS
DIASTOLIC BLOOD PRESSURE: 85 MMHG | SYSTOLIC BLOOD PRESSURE: 112 MMHG | HEIGHT: 63 IN | RESPIRATION RATE: 19 BRPM | HEART RATE: 82 BPM | BODY MASS INDEX: 40.4 KG/M2 | TEMPERATURE: 98.1 F | OXYGEN SATURATION: 95 % | WEIGHT: 228 LBS

## 2022-11-04 DIAGNOSIS — E66.01 CLASS 3 SEVERE OBESITY DUE TO EXCESS CALORIES WITH SERIOUS COMORBIDITY AND BODY MASS INDEX (BMI) OF 40.0 TO 44.9 IN ADULT: ICD-10-CM

## 2022-11-04 DIAGNOSIS — E78.2 MIXED HYPERLIPIDEMIA: ICD-10-CM

## 2022-11-04 LAB
B-HCG UR QL: NEGATIVE
TSH SERPL DL<=0.05 MIU/L-ACNC: <0.005 UIU/ML (ref 0.27–4.2)

## 2022-11-04 PROCEDURE — 25010000002 PROPOFOL 10 MG/ML EMULSION: Performed by: NURSE ANESTHETIST, CERTIFIED REGISTERED

## 2022-11-04 PROCEDURE — 84443 ASSAY THYROID STIM HORMONE: CPT | Performed by: SURGERY

## 2022-11-04 PROCEDURE — 87081 CULTURE SCREEN ONLY: CPT | Performed by: SURGERY

## 2022-11-04 PROCEDURE — 81025 URINE PREGNANCY TEST: CPT | Performed by: ANESTHESIOLOGY

## 2022-11-04 RX ORDER — LIDOCAINE HYDROCHLORIDE 10 MG/ML
0.5 INJECTION, SOLUTION EPIDURAL; INFILTRATION; INTRACAUDAL; PERINEURAL ONCE AS NEEDED
Status: CANCELLED | OUTPATIENT
Start: 2022-11-04

## 2022-11-04 RX ORDER — SODIUM CHLORIDE 9 MG/ML
500 INJECTION, SOLUTION INTRAVENOUS CONTINUOUS PRN
Status: DISCONTINUED | OUTPATIENT
Start: 2022-11-04 | End: 2022-11-04 | Stop reason: HOSPADM

## 2022-11-04 RX ORDER — SODIUM CHLORIDE 0.9 % (FLUSH) 0.9 %
10 SYRINGE (ML) INJECTION AS NEEDED
Status: DISCONTINUED | OUTPATIENT
Start: 2022-11-04 | End: 2022-11-04 | Stop reason: HOSPADM

## 2022-11-04 RX ORDER — LIDOCAINE HYDROCHLORIDE 20 MG/ML
INJECTION, SOLUTION EPIDURAL; INFILTRATION; INTRACAUDAL; PERINEURAL AS NEEDED
Status: DISCONTINUED | OUTPATIENT
Start: 2022-11-04 | End: 2022-11-04 | Stop reason: SURG

## 2022-11-04 RX ORDER — PROPOFOL 10 MG/ML
VIAL (ML) INTRAVENOUS AS NEEDED
Status: DISCONTINUED | OUTPATIENT
Start: 2022-11-04 | End: 2022-11-04 | Stop reason: SURG

## 2022-11-04 RX ADMIN — PROPOFOL 50 MG: 10 INJECTION, EMULSION INTRAVENOUS at 09:10

## 2022-11-04 RX ADMIN — LIDOCAINE HYDROCHLORIDE 100 MG: 20 INJECTION, SOLUTION EPIDURAL; INFILTRATION; INTRACAUDAL; PERINEURAL at 09:08

## 2022-11-04 RX ADMIN — SODIUM CHLORIDE: 0.9 INJECTION, SOLUTION INTRAVENOUS at 09:07

## 2022-11-04 RX ADMIN — PROPOFOL 50 MG: 10 INJECTION, EMULSION INTRAVENOUS at 09:12

## 2022-11-04 RX ADMIN — PROPOFOL 100 MG: 10 INJECTION, EMULSION INTRAVENOUS at 09:08

## 2022-11-04 NOTE — ANESTHESIA POSTPROCEDURE EVALUATION
Patient: Jade Lam    Procedure Summary     Date: 11/04/22 Room / Location: [facility] ENDOSCOPY 2 / [facility] ENDOSCOPY    Anesthesia Start: 0907 Anesthesia Stop: 0917    Procedure: ESOPHAGOGASTRODUODENOSCOPY WITH ANESTHESIA Diagnosis:       Class 3 severe obesity due to excess calories with serious comorbidity and body mass index (BMI) of 40.0 to 44.9 in adult (HCC)      Mixed hyperlipidemia      (Class 3 severe obesity due to excess calories with serious comorbidity and body mass index (BMI) of 40.0 to 44.9 in adult (HCC) [E66.01, Z68.41])      (Mixed hyperlipidemia [E78.2])    Surgeons: Corky Stevenson MD Provider: Addi Cintron CRNA    Anesthesia Type: MAC ASA Status: 3          Anesthesia Type: MAC    Vitals  Vitals Value Taken Time   /85 11/04/22 0931   Temp     Pulse 79 11/04/22 0932   Resp 19 11/04/22 0930   SpO2 96 % 11/04/22 0932   Vitals shown include unvalidated device data.        Post Anesthesia Care and Evaluation    Patient location during evaluation: PHASE II  Patient participation: complete - patient participated  Level of consciousness: awake and sleepy but conscious  Pain score: 0  Pain management: adequate    Airway patency: patent  Anesthetic complications: No anesthetic complications    Cardiovascular status: acceptable  Respiratory status: acceptable  Hydration status: acceptable

## 2022-11-04 NOTE — ANESTHESIA PREPROCEDURE EVALUATION
Anesthesia Evaluation     Patient summary reviewed   no history of anesthetic complications:  NPO Solid Status: > 8 hours  NPO Liquid Status: > 8 hours           Airway   Mallampati: I  TM distance: >3 FB  Neck ROM: full  No difficulty expected  Dental      Pulmonary    (+) sleep apnea,   (-) not a smoker  Cardiovascular   Exercise tolerance: excellent (>7 METS)    (+) hypertension, hyperlipidemia,       Neuro/Psych  (-) seizures, TIA, CVA  GI/Hepatic/Renal/Endo    (+) morbid obesity,    (-) liver disease, no renal disease, diabetes    Musculoskeletal     Abdominal    Substance History      OB/GYN          Other                        Anesthesia Plan    ASA 3     MAC     intravenous induction     Anesthetic plan, risks, benefits, and alternatives have been provided, discussed and informed consent has been obtained with: patient.        CODE STATUS:

## 2022-11-04 NOTE — PROGRESS NOTES
Patient Care Team:  Ryan Sams DO as PCP - General (Family Medicine)    Reason for Visit:  Surgical Weight loss      Subjective     Jade Lam is a pleasant 37 y.o. female and presents with morbid obesity with her Body mass index is 40.39 kg/m².    She is here for discussion of the upper endoscopic procedure.  She stated she has been with the disease of obesity for year(s).  She stated she suffers from hyper lipidemia, history of heartburn and morbid obesity due to her weight gain.  She stated that weight loss helps alleviate these symptoms.   She stated that she has tried multiple diet regimens to help with weight loss.  She stated that she has attempted these conservative methods for weight loss without maintaining long term success.  Today sheand myself will discuss surgical weight loss options such as the Laparoscopic Sleeve Gastrectomy or the Laparoscopic R - Y Gastric Bypass.        Review of Systems  General ROS: positive for  - fatigue and sleep disturbance  Respiratory ROS: no cough, shortness of breath, or wheezing  Cardiovascular ROS: no chest pain or dyspnea on exertion  Gastrointestinal ROS: no abdominal pain, change in bowel habits, or black or bloody stools    History  Past Medical History:   Diagnosis Date   • ADHD (attention deficit hyperactivity disorder)    • Hypertension      Past Surgical History:   Procedure Laterality Date   •  SECTION      x 2     Family History   Problem Relation Age of Onset   • Obesity Mother    • Hypertension Mother    • Diabetes Mother    • Hypertension Father    • Sleep apnea Maternal Grandmother    • Heart disease Maternal Grandmother    • Hypertension Maternal Grandmother    • Cancer Maternal Grandfather    • Hypertension Paternal Grandfather    • Heart disease Paternal Grandfather    • Diabetes Paternal Grandfather      Social History     Tobacco Use   • Smoking status: Former     Types: Cigarettes     Quit date: 5/3/2002     Years since  quittin.5   • Smokeless tobacco: Never   Vaping Use   • Vaping Use: Never used   Substance Use Topics   • Alcohol use: Never   • Drug use: Never     E-cigarette/Vaping   • E-cigarette/Vaping Use Never User      E-cigarette/Vaping Substances     Medications Prior to Admission   Medication Sig Dispense Refill Last Dose   • hydroCHLOROthiazide (HYDRODIURIL) 25 MG tablet Take 25 mg by mouth Daily.   11/3/2022   • spironolactone (ALDACTONE) 50 MG tablet Take 50 mg by mouth 2 (Two) Times a Day.   11/3/2022   • lisdexamfetamine (Vyvanse) 50 MG capsule Take 1 capsule by mouth Every Morning 30 capsule 0 Unknown     Allergies:  Patient has no known allergies.      Current Facility-Administered Medications:   •  sodium chloride 0.9 % flush 10 mL, 10 mL, Intravenous, PRN, Jessica Bui MD  •  sodium chloride 0.9 % infusion 500 mL, 500 mL, Intravenous, Continuous PRN, Jessica Bui MD    Objective     Vital Signs  Temp:  [98.1 °F (36.7 °C)] 98.1 °F (36.7 °C)  Heart Rate:  [73] 73  Resp:  [20] 20  BP: (131)/(81) 131/81  Body mass index is 40.39 kg/m².      22  0834   Weight: 103 kg (228 lb)       Physical Exam:      HEENT: extra ocular movement intact and sclera clear, anicteric  Respiratory: appears well, vitals normal, no respiratory distress, acyanotic, normal RR, chest clear, no wheezing, crepitations, rhonchi, normal symmetric air entry  Cardiovascular: Regular rate and rhythm, S1, S2 normal, no murmur, click, rub or gallop  GI: Soft, non-tender, normal bowel sounds; no bruits, organomegaly or masses.  Abnormal shape: obese  Musculoskeletal: inspection - no abnormality  Neurologic: alert, oriented, normal speech, no focal findings or movement disorder noted       Results Review:   None        Assessment & Plan   Encounter Diagnoses   Name Primary?   • Class 3 severe obesity due to excess calories with serious comorbidity and body mass index (BMI) of 40.0 to 44.9 in adult (HCC) Yes   • Mixed  hyperlipidemia    • History of heartburn            1.  I believe this patient will be a good candidate for weight loss surgery.  I have discussed the Marisa - Y Gastric Bypass, laparoscopic sleeve gastrectomy and the Laparoscopic Gastric Band procedures.  We discussed the benefits of the surgeries including the benefit of weight loss and the possible reversal of co-morbid conditions associated with morbid obesity. I explained to the patient that prior to making a definitive decision on the type of surgery she will require an esophagogastroduodenoscopy with biopsies to assess for any contraindications for surgical weight loss.  I explained the alternatives  include not doing anything, or pursuing an UGI series which only offers a diagnosis with potential less accuracy compared to EGD, the benefits of the EGD such as identifying the pathology and anatomy of the upper GI system, and the risks and complications of the endoscopy were discussed in detail as well. I discussed the risk of perforation (one out of 6285-8847, riskier with dilation), bleeding (one out of 500), and the rare risks of infection, adverse reaction to anesthesia, respiratory failure, cardiac failure including MI and adverse reaction to medications such as allergic reactions. We discussed consequences that could occur if a risk were to develop such as the need for hospitalization, blood transfusion, surgical intervention, medications, pain, disability and death. The patient verbalizes understanding and agrees to proceed. such as bleeding, perforation, swallowing difficulties and gas bloat can occur after this procedure.    Upon completion of our discussion and addressing and answering her questions to her satisfation, informed consent was obtained.  She will be scheduled accordingly for the esophagogastroduodenoscopy procedure.    I discussed the patients findings and my recommendations with patient.     Dr. Corky Stevenson MD [facility]    11/04/22  09:06  CDT  Patient Care Team:  Ryan Sams DO as PCP - General (Family Medicine)

## 2022-11-04 NOTE — H&P (VIEW-ONLY)
Patient Care Team:  Ryan Sams DO as PCP - General (Family Medicine)    Reason for Visit:  Surgical Weight loss      Subjective     Jade Lam is a pleasant 37 y.o. female and presents with morbid obesity with her Body mass index is 40.39 kg/m².    She is here for discussion of the upper endoscopic procedure.  She stated she has been with the disease of obesity for year(s).  She stated she suffers from hyper lipidemia, history of heartburn and morbid obesity due to her weight gain.  She stated that weight loss helps alleviate these symptoms.   She stated that she has tried multiple diet regimens to help with weight loss.  She stated that she has attempted these conservative methods for weight loss without maintaining long term success.  Today sheand myself will discuss surgical weight loss options such as the Laparoscopic Sleeve Gastrectomy or the Laparoscopic R - Y Gastric Bypass.        Review of Systems  General ROS: positive for  - fatigue and sleep disturbance  Respiratory ROS: no cough, shortness of breath, or wheezing  Cardiovascular ROS: no chest pain or dyspnea on exertion  Gastrointestinal ROS: no abdominal pain, change in bowel habits, or black or bloody stools    History  Past Medical History:   Diagnosis Date   • ADHD (attention deficit hyperactivity disorder)    • Hypertension      Past Surgical History:   Procedure Laterality Date   •  SECTION      x 2     Family History   Problem Relation Age of Onset   • Obesity Mother    • Hypertension Mother    • Diabetes Mother    • Hypertension Father    • Sleep apnea Maternal Grandmother    • Heart disease Maternal Grandmother    • Hypertension Maternal Grandmother    • Cancer Maternal Grandfather    • Hypertension Paternal Grandfather    • Heart disease Paternal Grandfather    • Diabetes Paternal Grandfather      Social History     Tobacco Use   • Smoking status: Former     Types: Cigarettes     Quit date: 5/3/2002     Years since  quittin.5   • Smokeless tobacco: Never   Vaping Use   • Vaping Use: Never used   Substance Use Topics   • Alcohol use: Never   • Drug use: Never     E-cigarette/Vaping   • E-cigarette/Vaping Use Never User      E-cigarette/Vaping Substances     Medications Prior to Admission   Medication Sig Dispense Refill Last Dose   • hydroCHLOROthiazide (HYDRODIURIL) 25 MG tablet Take 25 mg by mouth Daily.   11/3/2022   • spironolactone (ALDACTONE) 50 MG tablet Take 50 mg by mouth 2 (Two) Times a Day.   11/3/2022   • lisdexamfetamine (Vyvanse) 50 MG capsule Take 1 capsule by mouth Every Morning 30 capsule 0 Unknown     Allergies:  Patient has no known allergies.      Current Facility-Administered Medications:   •  sodium chloride 0.9 % flush 10 mL, 10 mL, Intravenous, PRN, Jessica Bui MD  •  sodium chloride 0.9 % infusion 500 mL, 500 mL, Intravenous, Continuous PRN, Jessica Bui MD    Objective     Vital Signs  Temp:  [98.1 °F (36.7 °C)] 98.1 °F (36.7 °C)  Heart Rate:  [73] 73  Resp:  [20] 20  BP: (131)/(81) 131/81  Body mass index is 40.39 kg/m².      22  0834   Weight: 103 kg (228 lb)       Physical Exam:      HEENT: extra ocular movement intact and sclera clear, anicteric  Respiratory: appears well, vitals normal, no respiratory distress, acyanotic, normal RR, chest clear, no wheezing, crepitations, rhonchi, normal symmetric air entry  Cardiovascular: Regular rate and rhythm, S1, S2 normal, no murmur, click, rub or gallop  GI: Soft, non-tender, normal bowel sounds; no bruits, organomegaly or masses.  Abnormal shape: obese  Musculoskeletal: inspection - no abnormality  Neurologic: alert, oriented, normal speech, no focal findings or movement disorder noted       Results Review:   None        Assessment & Plan   Encounter Diagnoses   Name Primary?   • Class 3 severe obesity due to excess calories with serious comorbidity and body mass index (BMI) of 40.0 to 44.9 in adult (HCC) Yes   • Mixed  hyperlipidemia    • History of heartburn            1.  I believe this patient will be a good candidate for weight loss surgery.  I have discussed the Marisa - Y Gastric Bypass, laparoscopic sleeve gastrectomy and the Laparoscopic Gastric Band procedures.  We discussed the benefits of the surgeries including the benefit of weight loss and the possible reversal of co-morbid conditions associated with morbid obesity. I explained to the patient that prior to making a definitive decision on the type of surgery she will require an esophagogastroduodenoscopy with biopsies to assess for any contraindications for surgical weight loss.  I explained the alternatives  include not doing anything, or pursuing an UGI series which only offers a diagnosis with potential less accuracy compared to EGD, the benefits of the EGD such as identifying the pathology and anatomy of the upper GI system, and the risks and complications of the endoscopy were discussed in detail as well. I discussed the risk of perforation (one out of 5880-2220, riskier with dilation), bleeding (one out of 500), and the rare risks of infection, adverse reaction to anesthesia, respiratory failure, cardiac failure including MI and adverse reaction to medications such as allergic reactions. We discussed consequences that could occur if a risk were to develop such as the need for hospitalization, blood transfusion, surgical intervention, medications, pain, disability and death. The patient verbalizes understanding and agrees to proceed. such as bleeding, perforation, swallowing difficulties and gas bloat can occur after this procedure.    Upon completion of our discussion and addressing and answering her questions to her satisfation, informed consent was obtained.  She will be scheduled accordingly for the esophagogastroduodenoscopy procedure.    I discussed the patients findings and my recommendations with patient.     Dr. Corky Stevenson MD [facility]    11/04/22  09:06  CDT  Patient Care Team:  Ryan Sams DO as PCP - General (Family Medicine)

## 2022-11-05 LAB — UREASE TISS QL: POSITIVE

## 2022-11-07 ENCOUNTER — OFFICE VISIT (OUTPATIENT)
Dept: BARIATRICS/WEIGHT MGMT | Facility: CLINIC | Age: 37
End: 2022-11-07

## 2022-11-07 VITALS
HEART RATE: 61 BPM | SYSTOLIC BLOOD PRESSURE: 123 MMHG | HEIGHT: 63 IN | BODY MASS INDEX: 40.4 KG/M2 | DIASTOLIC BLOOD PRESSURE: 86 MMHG | TEMPERATURE: 97.8 F | OXYGEN SATURATION: 94 % | WEIGHT: 228 LBS

## 2022-11-07 DIAGNOSIS — Z87.898 HISTORY OF HEARTBURN: ICD-10-CM

## 2022-11-07 DIAGNOSIS — R06.09 DYSPNEA ON EXERTION: ICD-10-CM

## 2022-11-07 DIAGNOSIS — E66.01 CLASS 3 SEVERE OBESITY DUE TO EXCESS CALORIES WITH SERIOUS COMORBIDITY AND BODY MASS INDEX (BMI) OF 40.0 TO 44.9 IN ADULT: Primary | ICD-10-CM

## 2022-11-07 DIAGNOSIS — E78.2 MIXED HYPERLIPIDEMIA: ICD-10-CM

## 2022-11-07 DIAGNOSIS — A04.8 H. PYLORI INFECTION: ICD-10-CM

## 2022-11-07 PROCEDURE — 99214 OFFICE O/P EST MOD 30 MIN: CPT | Performed by: SURGERY

## 2022-11-07 RX ORDER — AMOXICILLIN 500 MG/1
1000 CAPSULE ORAL 2 TIMES DAILY
Qty: 20 CAPSULE | Refills: 0 | Status: SHIPPED | OUTPATIENT
Start: 2022-11-07 | End: 2022-12-07

## 2022-11-07 RX ORDER — METRONIDAZOLE 500 MG/1
500 TABLET ORAL EVERY 8 HOURS SCHEDULED
Qty: 42 TABLET | Refills: 0 | Status: SHIPPED | OUTPATIENT
Start: 2022-11-07 | End: 2022-11-21

## 2022-11-07 RX ORDER — PANTOPRAZOLE SODIUM 40 MG/1
40 TABLET, DELAYED RELEASE ORAL 2 TIMES DAILY
Qty: 20 TABLET | Refills: 0 | Status: SHIPPED | OUTPATIENT
Start: 2022-11-07 | End: 2022-11-14

## 2022-11-07 NOTE — PROGRESS NOTES
"Patient Care Team:  Ryan Sams DO as PCP - General (Family Medicine)    Reason for Visit:  Surgical Weight loss      Subjective   Jade Lam is a 37 y.o. female.     Jade is here for follow-up and continued medical management of her morbid obesity.  She is currently on a prescription diet.  Jade previously was to apply dietary changes such as following the meal plan as directed.  She admits to following the dietary prescription.  As a result she loss weight since her last visit.  She is here also for follow-up of her upper endoscopy with findings of positive H. pylori infection.    Review Of Systems:  General ROS: negative  Respiratory ROS: no cough, shortness of breath, or wheezing  Cardiovascular ROS: no chest pain or dyspnea on exertion  Gastrointestinal ROS: no abdominal pain, change in bowel habits, or black or bloody stools    The following portions of the patient's history were reviewed and updated as appropriate: allergies, current medications, past family history, past medical history, past social history, past surgical history and problem list.    Objective   /86 (BP Location: Right arm, Patient Position: Sitting, Cuff Size: Adult)   Pulse 61   Temp 97.8 °F (36.6 °C)   Ht 159.4 cm (62.75\")   Wt 103 kg (228 lb)   SpO2 94%   BMI 40.71 kg/m²       11/07/22  1357   Weight: 103 kg (228 lb)       General Appearance:  awake, alert, oriented, in no acute distress  Lungs:  Normal expansion.  Clear to auscultation.  No rales, rhonchi, or wheezing.  Heart:  Heart regular rate and rhythm  Abdomen:  Soft, non-tender, normal bowel sounds; no bruits, organomegaly or masses.  Abnormal shape: obese    Assessment & Plan     Encounter Diagnoses   Name Primary?   • Class 3 severe obesity due to excess calories with serious comorbidity and body mass index (BMI) of 40.0 to 44.9 in adult ([facility]) Yes   • Dyspnea on exertion    • Mixed hyperlipidemia    • H. pylori infection    • History of heartburn "        Jade Lam was seen today for follow-up, obesity, nutrition counseling and weight loss.  She has lost weight since her last visit.  Today we discussed healthy changes in lifestyle, diet, and exercise. Dietician consultation obtained.  Jade Lam had received handouts to her explaining the recommendation on portion sizes/appetite control/reading nutrition labels.   Intensive behavioral therapy for obesity was done today as well.   Goals for this month are: The patient and I talked about H. pylori and is positive results.  I explained to her that she will require to take an antibiotic regimen x2 antibiotics with an antiacid medication to help with H. pylori eradication.  She is to take it for at least 10 days consecutively and as directed.  I explained to the patient side effects of antibiotics and to be aware of the possibility of yeast infection.    Follow up in one month for a weight recheck.

## 2022-11-09 ENCOUNTER — TELEPHONE (OUTPATIENT)
Dept: BARIATRICS/WEIGHT MGMT | Facility: CLINIC | Age: 37
End: 2022-11-09

## 2022-11-09 NOTE — TELEPHONE ENCOUNTER
Attempted to call patient about Protonix.  Pharmacy would like an alternative.  Patient needs to contact insurance to see what will be covered or she can purchase Protonix over-the-counter. No answer, VM left requesting patient to return our call

## 2022-11-14 ENCOUNTER — TELEPHONE (OUTPATIENT)
Dept: BARIATRICS/WEIGHT MGMT | Facility: CLINIC | Age: 37
End: 2022-11-14

## 2022-11-14 DIAGNOSIS — E66.01 CLASS 3 SEVERE OBESITY DUE TO EXCESS CALORIES WITH SERIOUS COMORBIDITY AND BODY MASS INDEX (BMI) OF 40.0 TO 44.9 IN ADULT: Primary | ICD-10-CM

## 2022-11-14 RX ORDER — PANTOPRAZOLE SODIUM 40 MG/1
40 TABLET, DELAYED RELEASE ORAL DAILY
Qty: 14 TABLET | Refills: 0 | Status: SHIPPED | OUTPATIENT
Start: 2022-11-14 | End: 2022-11-14

## 2022-11-14 RX ORDER — PANTOPRAZOLE SODIUM 40 MG/1
40 TABLET, DELAYED RELEASE ORAL DAILY
Qty: 21 TABLET | Refills: 0 | Status: SHIPPED | OUTPATIENT
Start: 2022-11-14 | End: 2022-12-05

## 2022-11-14 NOTE — TELEPHONE ENCOUNTER
I attempted to contact patient back for further  clarification about PPI not being covered.  I have sent in Protonix 40 mg to be taken once a day per patient insurance will cover this.  I would like for patient to also purchase Protonix over-the-counter so she can take 40 mg twice a day due to having H. pylori if she is able to do this.  There was no answer.  Voicemail left requesting patient to return our call.

## 2022-11-28 ENCOUNTER — LAB (OUTPATIENT)
Dept: LAB | Facility: HOSPITAL | Age: 37
End: 2022-11-28

## 2022-11-28 ENCOUNTER — OFFICE VISIT (OUTPATIENT)
Dept: BARIATRICS/WEIGHT MGMT | Facility: CLINIC | Age: 37
End: 2022-11-28

## 2022-11-28 VITALS
HEART RATE: 84 BPM | DIASTOLIC BLOOD PRESSURE: 89 MMHG | HEIGHT: 63 IN | SYSTOLIC BLOOD PRESSURE: 119 MMHG | OXYGEN SATURATION: 97 % | WEIGHT: 228.8 LBS | BODY MASS INDEX: 40.54 KG/M2 | TEMPERATURE: 97.7 F

## 2022-11-28 DIAGNOSIS — A04.8 H. PYLORI INFECTION: ICD-10-CM

## 2022-11-28 DIAGNOSIS — E66.01 CLASS 3 SEVERE OBESITY DUE TO EXCESS CALORIES WITH SERIOUS COMORBIDITY AND BODY MASS INDEX (BMI) OF 40.0 TO 44.9 IN ADULT: Primary | ICD-10-CM

## 2022-11-28 DIAGNOSIS — I10 PRIMARY HYPERTENSION: ICD-10-CM

## 2022-11-28 DIAGNOSIS — E78.2 MIXED HYPERLIPIDEMIA: ICD-10-CM

## 2022-11-28 PROCEDURE — 99214 OFFICE O/P EST MOD 30 MIN: CPT | Performed by: SURGERY

## 2022-11-28 PROCEDURE — 36415 COLL VENOUS BLD VENIPUNCTURE: CPT

## 2022-11-28 PROCEDURE — 86677 HELICOBACTER PYLORI ANTIBODY: CPT

## 2022-11-28 RX ORDER — ONDANSETRON 2 MG/ML
4 INJECTION INTRAMUSCULAR; INTRAVENOUS EVERY 6 HOURS PRN
Status: CANCELLED | OUTPATIENT
Start: 2022-11-28

## 2022-11-28 RX ORDER — ENOXAPARIN SODIUM 150 MG/ML
40 INJECTION SUBCUTANEOUS ONCE
Status: CANCELLED | OUTPATIENT
Start: 2022-11-28 | End: 2022-11-28

## 2022-11-28 RX ORDER — SCOLOPAMINE TRANSDERMAL SYSTEM 1 MG/1
1 PATCH, EXTENDED RELEASE TRANSDERMAL CONTINUOUS
Status: CANCELLED | OUTPATIENT
Start: 2022-11-28 | End: 2022-12-01

## 2022-11-28 RX ORDER — GABAPENTIN 250 MG/5ML
250 SOLUTION ORAL ONCE
Status: CANCELLED | OUTPATIENT
Start: 2022-11-28 | End: 2022-11-28

## 2022-11-28 NOTE — PROGRESS NOTES
"Metabolic and Bariatric Surgery Adult Nutrition Assessment    Patient Name: Jade Lam   YOB: 1985   MRN: [id number]     Assessment Date:  2022     Reason for Visit: Follow-up Nutrition Assessment     Treatment Pathway: Preoperative Bariatric Surgery, Visit 6    Assessment    Anthropometrics   Wt Readings from Last 1 Encounters:   22 104 kg (228 lb 12.8 oz)     Ht Readings from Last 1 Encounters:   22 159.4 cm (62.75\")     BMI Readings from Last 1 Encounters:   22 40.85 kg/m²        Initial Weight/Date: 231.2 (May 2022)  Weight Changes since last visit: +0.8 lbs  Net Weight Change: -2.4 lbs    Past Medical History:   Diagnosis Date   • ADHD (attention deficit hyperactivity disorder)    • Hypertension       Past Surgical History:   Procedure Laterality Date   •  SECTION      x 2   • ENDOSCOPY N/A 2022    Procedure: ESOPHAGOGASTRODUODENOSCOPY WITH ANESTHESIA;  Surgeon: Corky Stevenson MD;  Location: [facility] ENDOSCOPY;  Service: General;  Laterality: N/A;  pre screen  post normal  Dr. Sams      Current Outpatient Medications   Medication Sig Dispense Refill   • amoxicillin (AMOXIL) 500 MG capsule Take 2 capsules by mouth 2 (Two) Times a Day. 20 capsule 0   • hydroCHLOROthiazide (HYDRODIURIL) 25 MG tablet Take 25 mg by mouth Daily.     • lisdexamfetamine (Vyvanse) 50 MG capsule Take 1 capsule by mouth Every Morning 30 capsule 0   • pantoprazole (PROTONIX) 40 MG EC tablet Take 1 tablet by mouth Daily for 21 days. 21 tablet 0   • spironolactone (ALDACTONE) 50 MG tablet Take 50 mg by mouth 2 (Two) Times a Day.       No current facility-administered medications for this visit.      No Known Allergies       Nutrition Recall  Eating 4 meals daily.  Snacking -not usually utilizing free foods if necessary.   Monitoring portions- measuring cups most of the time.   Calculating Protein- 65+ grams.   Drinking sugary/carbonated beverages- No  Fluid Intake- 1 " gallon/d.      Success this Month:not craving sugars- was able to avoid desserts at Thanksgiving, Not snacking at night.   Barriers: none identified at this time    Exercise: None currently, has walked on treadmill some.   Recommended increasing physical activity, beyond normal daily habits, gradually working to reach ~30 minutes daily.     Nutrition Intervention  Nutrition education and nutrition coaching for behavior change provided.  Strategies used included Comprehensive education, Problem Solving, Skill Development for meal planning & Identifying snacking triggers,  and Ongoing reinforcement  Review of medical weight loss prescription 4 meal/day plan and reviewed nutritional needs for Preoperative Bariatric Surgery, Visit 6.  Self-monitoring strategies such as keeping a food journal (on paper or electronically) and calculating fluid/protein intake were discussed.    Recommended Diet Changes  Continue Medical Weight Loss Prescription plan at this time; transition to Liver Shrinking Diet 14 days prior to surgery. Reviewed LSD guidelines and meal adjustments      Goals  1. Measure all foods; every food every time.  2. Identify snacking triggers and develop coping mechanism to overcome snacking  3. Follow liver shrinking diet 2 wks prior to surgery.    Monitoring/Evaluation Plan  Anticipate follow up per program protocol. Continue collaboration of care with physician and treatment team.     Electronically signed by  Mamie Stubbs RDN, LD  11/28/2022 10:27 CST.

## 2022-11-28 NOTE — PROGRESS NOTES
Patient Care Team:  Ryan Sams DO as PCP - General (Family Medicine)    Reason for Visit:  Surgical Weight loss    Subjective      Jade Lam is a pleasant 37 y.o. female and presents with morbid obesity with her Body mass index is 40.85 kg/m².    She is here for discussion of weight loss options.  She stated she has been with the disease of obesity for year(s).  She stated she suffers from hyperlipidemia, hypertension and morbid obesity due to her weight gain.  She stated that weight loss helps alleviate these symptoms.   She stated that she has tried to diet regimens including completing a medically supervised weight loss program to help with weight loss.  She stated that she has attempted these conservative methods for weight loss without maintaining long term success.  Today she would like to discuss surgical weight loss options such as the Laparoscopic Sleeve Gastrectomy or the Laparoscopic R - Y Gastric Bypass.  Of note the patient was also found to have a positive H. pylori KIANA study and has completed her antibiotic regimen.  We are awaiting her H. pylori antigen laboratory results.    Review of Systems  General ROS: positive for  - sleep disturbance  Psychological ROS: negative  Respiratory ROS: no cough, shortness of breath, or wheezing  Cardiovascular ROS: no chest pain or dyspnea on exertion  Gastrointestinal ROS: no abdominal pain, change in bowel habits, or black or bloody stools    History  Past Medical History:   Diagnosis Date   • ADHD (attention deficit hyperactivity disorder)    • Hypertension      Past Surgical History:   Procedure Laterality Date   •  SECTION      x 2   • ENDOSCOPY N/A 2022    Procedure: ESOPHAGOGASTRODUODENOSCOPY WITH ANESTHESIA;  Surgeon: Corky Stevenson MD;  Location: [facility] ENDOSCOPY;  Service: General;  Laterality: N/A;  pre screen  post normal  Dr. Sams     Family History   Problem Relation Age of Onset   • Obesity Mother    •  Hypertension Mother    • Diabetes Mother    • Hypertension Father    • Sleep apnea Maternal Grandmother    • Heart disease Maternal Grandmother    • Hypertension Maternal Grandmother    • Cancer Maternal Grandfather    • Hypertension Paternal Grandfather    • Heart disease Paternal Grandfather    • Diabetes Paternal Grandfather      Social History     Tobacco Use   • Smoking status: Former     Types: Cigarettes     Quit date: 5/3/2002     Years since quittin.5   • Smokeless tobacco: Never   Vaping Use   • Vaping Use: Never used   Substance Use Topics   • Alcohol use: Never   • Drug use: Never     E-cigarette/Vaping   • E-cigarette/Vaping Use Never User      E-cigarette/Vaping Substances     (Not in a hospital admission)    Allergies:  Patient has no known allergies.      Current Outpatient Medications:   •  hydroCHLOROthiazide (HYDRODIURIL) 25 MG tablet, Take 25 mg by mouth Daily., Disp: , Rfl:   •  spironolactone (ALDACTONE) 50 MG tablet, Take 50 mg by mouth 2 (Two) Times a Day., Disp: , Rfl:   •  amoxicillin (AMOXIL) 500 MG capsule, Take 2 capsules by mouth 2 (Two) Times a Day., Disp: 20 capsule, Rfl: 0  •  lisdexamfetamine (Vyvanse) 50 MG capsule, Take 1 capsule by mouth Every Morning, Disp: 30 capsule, Rfl: 0  •  pantoprazole (PROTONIX) 40 MG EC tablet, Take 1 tablet by mouth Daily for 21 days., Disp: 21 tablet, Rfl: 0    Objective     Vital Signs  Temp:  [97.7 °F (36.5 °C)] 97.7 °F (36.5 °C)  Heart Rate:  [84] 84  BP: (119)/(89) 119/89  Body mass index is 40.85 kg/m².      22  0907   Weight: 104 kg (228 lb 12.8 oz)       General Appearance:  awake, alert, oriented, in no acute distress  Lungs:  Normal expansion.  Clear to auscultation.  No rales, rhonchi, or wheezing.  Heart:  Heart regular rate and rhythm  Abdomen:  Soft, non-tender, normal bowel sounds; no bruits, organomegaly or masses.  Abnormal shape: obese      Results Review:   I reviewed the patient's new clinical results.        Assessment &  Plan   Encounter Diagnoses   Name Primary?   • Class 3 severe obesity due to excess calories with serious comorbidity and body mass index (BMI) of 40.0 to 44.9 in adult (HCC) Yes   • H. pylori infection    • Mixed hyperlipidemia    • Primary hypertension        I believe this patient will be a good candidate for weight loss surgery.    She has chosen laparoscopic sleeve gastrectomy. I agree with this decision.  I have discussed the Marisa - Y Gastric Bypass, laparoscopic sleeve gastrectomy and the Laparoscopic Gastric Band procedures to provide the alternatives which includes non surgical weight loss options as well.  We discussed the benefits of the surgeries including the benefit of weight loss and the possible reversal of co-morbid conditions associated with morbid obesity.  She is aware that the Sleeve procedure is not reversible.  We discussed the complications and risks which include the risk of perforation, leakage,bleeding, intra-abdominal organ injury, specifically at high risks of the liver and spleen, stenosis or ulcerations, the risk of venous thrombosis formation in the lungs, mesentery veins or lower extremities  leading to possible organ injury and death.  I explained to the patient that there is a risk of infection.  I explained to her the possibility that this procedure may not be performed laparoscopically and may require being converted to an opened procedure or aborted due to abnormal anatomy.  Also postoperatively there is a risk of increased GERD symptoms after this procedure.  I have explained if she develops intestinal metaplasia of her esophagus consideration for conversion to another weight loss procedure may be necessary.    I have explained to the patient that depression, addictive behaviors and even an increase in suicidal emotions can occur after surgery and even though they have undergone a mental health evaluation through psychology/psychiatry or by a registered therapist she may  require additional evaluation and treatment in the future.  Nicotine cessation needs to continue even after their surgical procedure and nicotine products should be avoided due to the side effects of these products.  I explained to Jade Lam not to plan for pregnancy within 12 to 18 months of her procedure.  I explained to her postoperatively she should avoid having unprotected sex that would increase her risk of pregnancy during the postoperative period of 1 to 1-1/2 years.      I have reviewed with the patient her 30-day mortality risk using the Bariatric Surgical Risk/Benefit Calculator to be less than 0.05%.    I explained to the patient that the success of the surgery is directly related to the motivation and dedication to behavioral changes that they have learned during their preoperative course.  There is data that shows approximately 10% of patients may have satisfactory weight loss or regain their weight they have lost after surgery.  It is more likely due to returning to the previous behaviors they incorporated during their disease of obesity.  I also encourage Jade Lam to incorporate exercise again after surgery weight restrictions have been removed postoperatively.    Deja Report   As part of this patient's treatment plan I am prescribing controlled substances.  We review Deja in our educational course.  The patient has been made aware of appropriate use of such medications, including potential risk of somnolence, limited ability to drive and /or work safely, and potential for dependence or overdose. It has also been made clear that these medications are for use by this patient only, without concomitant use of alcohol or other substances unless prescribed.    Jade Lam will be required to complete the educational preoperative class detailing terms of the preoperative and postoperative recommendations, risks of surgery, the monitoring of the patient's DEJA reports if necessary.  Jade Lam is aware that inappropriate use of prescribed medications will result in cessation of prescribing such medications.    DEJA report has been reviewed.      History and physical exam exhibit continued safe and appropriate use of controlled substances.       Jade Lam understands the surgical procedures and the different surgical options that are available.   Jade Lam understands the lifestyle changes that are required after surgery and has agreed to follow the guidelines outlined in the weight management program. Jade Lam also expressed understanding of the risks involved and had all of her questions answered and desires to proceed.    Upon completion of our discussion and addressing and answering her questions to her satisfaction, informed consent was obtained.   She will be scheduled accordingly for a laparoscopic Sleeve gastrectomy procedure.      I discussed the patient's findings and my recommendations with patient.     I have also recommended that she obtain completion of her laboratory work as mentioned above, preoperative laboratory work, preoperative educational course and preoperative diet regimen prior to surgery consideration.      Dr. Corky Stevenson MD FACS    11/28/22  10:01 CST  Patient Care Team:  Ryan Sams DO as PCP - General (Family Medicine)

## 2022-11-29 LAB — H PYLORI IGM SER-ACNC: <9 UNITS (ref 0–8.9)

## 2022-12-16 ENCOUNTER — LAB (OUTPATIENT)
Dept: LAB | Facility: HOSPITAL | Age: 37
End: 2022-12-16

## 2022-12-16 DIAGNOSIS — E66.01 CLASS 3 SEVERE OBESITY DUE TO EXCESS CALORIES WITH SERIOUS COMORBIDITY AND BODY MASS INDEX (BMI) OF 40.0 TO 44.9 IN ADULT: ICD-10-CM

## 2022-12-16 LAB
ALBUMIN SERPL-MCNC: 4.5 G/DL (ref 3.5–5.2)
ALBUMIN/GLOB SERPL: 1.3 G/DL
ALP SERPL-CCNC: 64 U/L (ref 39–117)
ALT SERPL W P-5'-P-CCNC: 24 U/L (ref 1–33)
ANION GAP SERPL CALCULATED.3IONS-SCNC: 10 MMOL/L (ref 5–15)
APTT PPP: 28.3 SECONDS (ref 24.1–35)
AST SERPL-CCNC: 16 U/L (ref 1–32)
BILIRUB SERPL-MCNC: 0.4 MG/DL (ref 0–1.2)
BILIRUB UR QL STRIP: NEGATIVE
BUN SERPL-MCNC: 17 MG/DL (ref 6–20)
BUN/CREAT SERPL: 25.8 (ref 7–25)
CALCIUM SPEC-SCNC: 9.7 MG/DL (ref 8.6–10.5)
CHLORIDE SERPL-SCNC: 104 MMOL/L (ref 98–107)
CLARITY UR: CLEAR
CO2 SERPL-SCNC: 24 MMOL/L (ref 22–29)
COLOR UR: YELLOW
CREAT SERPL-MCNC: 0.66 MG/DL (ref 0.57–1)
DEPRECATED RDW RBC AUTO: 44.5 FL (ref 37–54)
EGFRCR SERPLBLD CKD-EPI 2021: 114.6 ML/MIN/1.73
ERYTHROCYTE [DISTWIDTH] IN BLOOD BY AUTOMATED COUNT: 14.9 % (ref 12.3–15.4)
GLOBULIN UR ELPH-MCNC: 3.5 GM/DL
GLUCOSE SERPL-MCNC: 118 MG/DL (ref 65–99)
GLUCOSE UR STRIP-MCNC: NEGATIVE MG/DL
HCT VFR BLD AUTO: 42.5 % (ref 34–46.6)
HGB BLD-MCNC: 13.6 G/DL (ref 12–15.9)
HGB UR QL STRIP.AUTO: NEGATIVE
INR PPP: 1 (ref 0.91–1.09)
KETONES UR QL STRIP: NEGATIVE
LEUKOCYTE ESTERASE UR QL STRIP.AUTO: NEGATIVE
MCH RBC QN AUTO: 26.1 PG (ref 26.6–33)
MCHC RBC AUTO-ENTMCNC: 32 G/DL (ref 31.5–35.7)
MCV RBC AUTO: 81.4 FL (ref 79–97)
NITRITE UR QL STRIP: NEGATIVE
PH UR STRIP.AUTO: 5.5 [PH] (ref 5–8)
PLATELET # BLD AUTO: 276 10*3/MM3 (ref 140–450)
PMV BLD AUTO: 9.8 FL (ref 6–12)
POTASSIUM SERPL-SCNC: 4.6 MMOL/L (ref 3.5–5.2)
PROT SERPL-MCNC: 8 G/DL (ref 6–8.5)
PROT UR QL STRIP: NEGATIVE
PROTHROMBIN TIME: 13.3 SECONDS (ref 11.8–14.8)
RBC # BLD AUTO: 5.22 10*6/MM3 (ref 3.77–5.28)
SODIUM SERPL-SCNC: 138 MMOL/L (ref 136–145)
SP GR UR STRIP: 1.02 (ref 1–1.03)
TSH SERPL DL<=0.05 MIU/L-ACNC: <0.005 UIU/ML (ref 0.27–4.2)
UROBILINOGEN UR QL STRIP: NORMAL
WBC NRBC COR # BLD: 7.78 10*3/MM3 (ref 3.4–10.8)

## 2022-12-16 PROCEDURE — 36415 COLL VENOUS BLD VENIPUNCTURE: CPT

## 2022-12-16 PROCEDURE — 85610 PROTHROMBIN TIME: CPT

## 2022-12-16 PROCEDURE — 80050 GENERAL HEALTH PANEL: CPT

## 2022-12-16 PROCEDURE — 81003 URINALYSIS AUTO W/O SCOPE: CPT

## 2022-12-16 PROCEDURE — 85730 THROMBOPLASTIN TIME PARTIAL: CPT

## 2022-12-17 ENCOUNTER — TELEPHONE (OUTPATIENT)
Dept: BARIATRICS/WEIGHT MGMT | Facility: CLINIC | Age: 37
End: 2022-12-17

## 2022-12-17 DIAGNOSIS — R79.89 ABNORMAL TSH: Primary | ICD-10-CM

## 2022-12-19 ENCOUNTER — TELEPHONE (OUTPATIENT)
Dept: BARIATRICS/WEIGHT MGMT | Facility: CLINIC | Age: 37
End: 2022-12-19

## 2022-12-19 DIAGNOSIS — E66.01 CLASS 3 SEVERE OBESITY DUE TO EXCESS CALORIES WITH SERIOUS COMORBIDITY AND BODY MASS INDEX (BMI) OF 40.0 TO 44.9 IN ADULT: Primary | ICD-10-CM

## 2022-12-19 DIAGNOSIS — I10 PRIMARY HYPERTENSION: ICD-10-CM

## 2022-12-19 NOTE — TELEPHONE ENCOUNTER
Patient was contacted today about her low TSH levels.  I explained to the patient that I have additional labs that I would like to obtain from her.  She is also scheduled for an ultrasound.  We will try to accommodate her to allow her to get these labs at [place].  I explained to the patient that a low TSH could mean that she has elevated thyroid hormones and the cause of it is at this point unknown.  Prior to having her sleeve gastrectomy I thought it was warranted to assess the etiology of the low TSH which the patient agrees.  We will cancel her surgery as scheduled until we identify the cause of her low TSH.

## 2022-12-21 ENCOUNTER — TREATMENT (OUTPATIENT)
Dept: BARIATRICS/WEIGHT MGMT | Facility: CLINIC | Age: 37
End: 2022-12-21

## 2022-12-21 VITALS — HEIGHT: 63 IN | WEIGHT: 219.8 LBS | BODY MASS INDEX: 38.95 KG/M2

## 2022-12-21 NOTE — PROGRESS NOTES
"Date: 12/16/22  BOOTCAMP              Information and Education Class for Pre and Post                           Surgery Care, Diets and Daily Living.       Surgery Type: Sleeve Gastrectomy Consent on File    Height: 5'2.75\"  Weight: 219.8LBS  BMI: 39.25    Diet Stages Form given including diet stages and surgery dates/times   Weight Loss Surgery Patient Contract on File      Patient has signed/agreed with the Diet Stage & Medication discontinue sheet:                  1) Medications have been review by Dr Stevenson.                2) Patient informed to stop NSAID'S one week prior to surgery.                         If the patient is taking Metformin he/she will need to discontinue                   two weeks prior to surgery.                      Birth control Medications are to be discontinued two weeks prior                  and four week post surgery.                     They have also been instructed not to take                                          the following medications the morning of their procedure on the Bariatric Surgery Instructions Sheet:                3) Dr Stevenson/Surgeon recommends that this patient take the following two                   hours prior to their arrival time:                             A)   2 extra strength Tylenol (1000mg)                           B)    8 ounces (Only) of Sugar Free Gatorade, G2 or Powerade Zero                                   (no red or pink in color)                                        4) During Bootcamp our patient also met with the Outpatient Surgery Staff  for pre-surgery instructions.      Patient also received [facility] Surgery Post Follow up Appointments.     DATE@  15:00 CST    "

## 2023-01-25 ENCOUNTER — OFFICE VISIT (OUTPATIENT)
Dept: BARIATRICS/WEIGHT MGMT | Facility: CLINIC | Age: 38
End: 2023-01-25
Payer: COMMERCIAL

## 2023-01-25 VITALS
WEIGHT: 223.4 LBS | TEMPERATURE: 98.7 F | BODY MASS INDEX: 39.58 KG/M2 | SYSTOLIC BLOOD PRESSURE: 119 MMHG | HEIGHT: 63 IN | OXYGEN SATURATION: 92 % | DIASTOLIC BLOOD PRESSURE: 84 MMHG | HEART RATE: 102 BPM

## 2023-01-25 DIAGNOSIS — E66.01 CLASS 3 SEVERE OBESITY DUE TO EXCESS CALORIES WITH SERIOUS COMORBIDITY AND BODY MASS INDEX (BMI) OF 40.0 TO 44.9 IN ADULT: ICD-10-CM

## 2023-01-25 DIAGNOSIS — E04.1 NODULE OF LEFT LOBE OF THYROID GLAND: Primary | ICD-10-CM

## 2023-01-25 PROCEDURE — 99213 OFFICE O/P EST LOW 20 MIN: CPT | Performed by: SURGERY

## 2023-01-25 NOTE — PROGRESS NOTES
"Patient Care Team:  Ryan Sams DO as PCP - General (Family Medicine)  Don Medeiros MD as Consulting Physician (Pulmonary Disease)    Reason for Visit:  Surgical Weight loss      Subjective   Jade Lam is a 37 y.o. female.     Jade is here for follow-up and continued medical management of her morbid obesity.  She is currently on a prescription diet.  Jade previously was to apply dietary changes such as following the meal plan as directed.  She admits to following the dietary prescription.  However the patient is also concerned about her ultrasound findings of her thyroid gland.  She is here for further discussion and recommendations.    Review Of Systems:  General ROS: positive for  - fatigue and sleep disturbance  Respiratory ROS: no cough, shortness of breath, or wheezing  Cardiovascular ROS: no chest pain or dyspnea on exertion  Gastrointestinal ROS: no abdominal pain, change in bowel habits, or black or bloody stools    The following portions of the patient's history were reviewed and updated as appropriate: allergies, current medications, past family history, past medical history, past social history, past surgical history and problem list.    Objective   /84 (BP Location: Right arm, Patient Position: Sitting, Cuff Size: Adult)   Pulse 102   Temp 98.7 °F (37.1 °C)   Ht 159.4 cm (62.75\")   Wt 101 kg (223 lb 6.4 oz)   SpO2 92%   BMI 39.89 kg/m²       01/25/23  1320   Weight: 101 kg (223 lb 6.4 oz)       General Appearance:  awake, alert, oriented, in no acute distress  Neck:  Thyroid- nodules- left  Lungs:  Normal expansion.  Clear to auscultation.  No rales, rhonchi, or wheezing.  Heart:  Heart regular rate and rhythm  Abdomen:  Soft, non-tender, normal bowel sounds; no bruits, organomegaly or masses.  Abnormal shape: obese    Assessment & Plan     Encounter Diagnoses   Name Primary?   • Nodule of left lobe of thyroid gland Yes       Jade Lam was seen today for " follow-up, obesity, nutrition counseling and weight loss.  She has gained weight since her last visit.  More likely secondary to the distractions of her ultrasound findings.  Today we discussed her ultrasound findings and I recommended that we have her seen by an ENT physician.  She stated she would like to utilize an ENT she is familiar with and has chosen Dr. Walker.  I placed consultation for the patient accordingly.  Today we also discussed healthy changes in lifestyle, diet, and exercise. Dietician consultation obtained.  Jade LYNN Lam had received handouts to her explaining the recommendation on portion sizes/appetite control/reading nutrition labels.   Intensive behavioral therapy for obesity was done today as well.   Goals for this month are: She is to visit with Dr. Walker for recommendations regarding her left thyroid nodule.  She will continue with our program following the dietary prescription accordingly.  Upon his assessment and recommendations we will then determine other options including surgical options for the treatment of her morbid obesity.    Follow up in one month for a weight recheck.  Answers for HPI/ROS submitted by the patient on 1/24/2023  Please describe your symptoms.: Follow up thyroid us  Have you had these symptoms before?: No  How long have you been having these symptoms?: Greater than 2 weeks  Please list any medications you are currently taking for this condition.: Spironolactone 25mg po bid, Hctz 25 po daily  Please describe any probable cause for these symptoms. : Unknown  What is the primary reason for your visit?: Other

## 2023-05-02 ENCOUNTER — OFFICE VISIT (OUTPATIENT)
Dept: BARIATRICS/WEIGHT MGMT | Facility: CLINIC | Age: 38
End: 2023-05-02
Payer: COMMERCIAL

## 2023-05-02 VITALS
OXYGEN SATURATION: 98 % | HEIGHT: 63 IN | DIASTOLIC BLOOD PRESSURE: 84 MMHG | BODY MASS INDEX: 40.36 KG/M2 | TEMPERATURE: 98.4 F | HEART RATE: 89 BPM | SYSTOLIC BLOOD PRESSURE: 113 MMHG | WEIGHT: 227.8 LBS

## 2023-05-02 DIAGNOSIS — E78.2 MIXED HYPERLIPIDEMIA: ICD-10-CM

## 2023-05-02 DIAGNOSIS — E66.01 CLASS 3 SEVERE OBESITY DUE TO EXCESS CALORIES WITH SERIOUS COMORBIDITY AND BODY MASS INDEX (BMI) OF 40.0 TO 44.9 IN ADULT: Primary | ICD-10-CM

## 2023-05-02 DIAGNOSIS — I10 PRIMARY HYPERTENSION: ICD-10-CM

## 2023-05-02 PROCEDURE — 99213 OFFICE O/P EST LOW 20 MIN: CPT | Performed by: SURGERY

## 2023-05-02 RX ORDER — FUROSEMIDE 20 MG/1
20 TABLET ORAL DAILY PRN
COMMUNITY

## 2023-05-02 NOTE — PROGRESS NOTES
"Patient Care Team:  Ryan Sams DO as PCP - General (Family Medicine)  Don Medeiros MD as Consulting Physician (Pulmonary Disease)  Domingo Witt MD as Consulting Physician (Otolaryngology)    Reason for Visit:  Surgical Weight loss      Subjective   Jade Lam is a 37 y.o. female.     Jade is here for follow-up and continued medical management of her morbid obesity.  She is currently on a prescription diet.  Jade previously was to apply dietary changes such as following the meal plan as directed.  She admits to struggling to follow dietary prescription this past month due to a lot of family distractions.  As a result she gained weight since her last visit.    Review Of Systems:  General ROS: negative  Respiratory ROS: no cough, shortness of breath, or wheezing  Cardiovascular ROS: no chest pain or dyspnea on exertion  Gastrointestinal ROS: no abdominal pain, change in bowel habits, or black or bloody stools    The following portions of the patient's history were reviewed and updated as appropriate: allergies, current medications, past family history, past medical history, past social history, past surgical history and problem list.    Objective   /84 (BP Location: Right arm, Patient Position: Sitting, Cuff Size: Adult)   Pulse 89   Temp 98.4 °F (36.9 °C)   Ht 159.4 cm (62.75\")   Wt 103 kg (227 lb 12.8 oz)   SpO2 98%   BMI 40.68 kg/m²       05/02/23  0836   Weight: 103 kg (227 lb 12.8 oz)           General Appearance:  awake, alert, oriented, in no acute distress      Assessment & Plan     Encounter Diagnoses   Name Primary?   • Class 3 severe obesity due to excess calories with serious comorbidity and body mass index (BMI) of 40.0 to 44.9 in adult Yes   • Primary hypertension    • Mixed hyperlipidemia      A total of 20 minutes was spent face-to-face with this patient during this encounter and over half the time was spent on counseling and coordination of care of " the patient's morbid obesity.  Jade Lam was seen today for follow-up, obesity, nutrition counseling and weight loss.  She has gained weight since her last visit.  Today we discussed healthy changes in lifestyle, diet, and exercise. Dietician consultation obtained.  Jade Lam had received handouts to her explaining the recommendation on portion sizes/appetite control/reading nutrition labels.   Intensive behavioral therapy for obesity was done today as well.   Goals for this month are: I recommended the patient refocus on herself and her main goals.  She is also stressed about her thyroid condition.  She is awaiting the biopsy results which are pending.    Follow up in one month for a weight recheck.

## 2023-06-06 ENCOUNTER — OFFICE VISIT (OUTPATIENT)
Dept: BARIATRICS/WEIGHT MGMT | Facility: CLINIC | Age: 38
End: 2023-06-06
Payer: COMMERCIAL

## 2023-06-06 VITALS
SYSTOLIC BLOOD PRESSURE: 123 MMHG | HEIGHT: 63 IN | DIASTOLIC BLOOD PRESSURE: 85 MMHG | WEIGHT: 226.2 LBS | OXYGEN SATURATION: 97 % | HEART RATE: 81 BPM | BODY MASS INDEX: 40.08 KG/M2 | TEMPERATURE: 98.2 F

## 2023-06-06 DIAGNOSIS — E66.01 CLASS 3 SEVERE OBESITY DUE TO EXCESS CALORIES WITH SERIOUS COMORBIDITY AND BODY MASS INDEX (BMI) OF 40.0 TO 44.9 IN ADULT: Primary | ICD-10-CM

## 2023-06-06 DIAGNOSIS — Z13.21 SCREENING FOR MALNUTRITION: ICD-10-CM

## 2023-06-06 DIAGNOSIS — R73.09 ELEVATED GLUCOSE: ICD-10-CM

## 2023-06-06 DIAGNOSIS — R73.03 PREDIABETES: ICD-10-CM

## 2023-06-06 NOTE — PROGRESS NOTES
"Patient Care Team:  Ryan Sams DO as PCP - General (Family Medicine)  Don Medeiros MD as Consulting Physician (Pulmonary Disease)  Domingo Witt MD as Consulting Physician (Otolaryngology)    Reason for Visit: Medical weight loss    Subjective   Jade Lam is a 37 y.o. female.     Jade is here for follow-up and continued medical management of her morbid obesity.  She is currently on a prescription diet.  Jade previously was to apply dietary changes such as following the meal plan as directed.  Patient admits to eating around 4 meals per day.  She  admits to drinking at least 64 ounces or more of fluid each day and  is unsure how many grams of protein..  She  is currently exercising walking 30 minutes/day  3 days/week and weight lifting weekly.  She  states that she has gone without soda intake and denies  nicotine use.  Patient has lost 1  pounds since her last appointment with us.     She is interested in beginning medication to assist with weight loss.  She states that at this time she does not wish to proceed with surgical intervention.    Review Of Systems:  Review of Systems   Constitutional: Negative.    Respiratory: Negative.     Cardiovascular: Negative.    Gastrointestinal: Negative.    Endocrine: Negative.    Musculoskeletal: Negative.    Psychiatric/Behavioral: Negative.         The following portions of the patient's history were reviewed and updated as appropriate: allergies, current medications, past family history, past medical history, past social history, past surgical history, and problem list.    Objective   /85 (BP Location: Right arm, Patient Position: Sitting, Cuff Size: Adult)   Pulse 81   Temp 98.2 °F (36.8 °C)   Ht 159.4 cm (62.75\")   Wt 103 kg (226 lb 3.2 oz)   SpO2 97%   BMI 40.39 kg/m²       06/06/23  0945   Weight: 103 kg (226 lb 3.2 oz)            Physical Exam  Vitals reviewed.   Constitutional:       Appearance: She is obese. "   Cardiovascular:      Rate and Rhythm: Normal rate and regular rhythm.   Pulmonary:      Effort: Pulmonary effort is normal.   Skin:     General: Skin is warm and dry.   Neurological:      Mental Status: She is alert and oriented to person, place, and time.   Psychiatric:         Mood and Affect: Mood normal.         Behavior: Behavior normal.            Assessment & Plan   Diagnoses and all orders for this visit:    1. Class 3 severe obesity due to excess calories with serious comorbidity and body mass index (BMI) of 40.0 to 44.9 in adult (Primary)  Assessment & Plan:  Patient's (Body mass index is 40.39 kg/m².) indicates that they are morbidly/severely obese (BMI > 40 or > 35 with obesity - related health condition) with health conditions that include  Prediabetes  . Weight is unchanged. BMI  is above average; BMI management plan is completed. We discussed portion control and increasing exercise.     Orders:  -     CBC & Differential; Future  -     Comprehensive Metabolic Panel; Future  -     Zinc; Future  -     Folate; Future  -     Vitamin A & E; Future  -     Vitamin B12; Future  -     Vitamin D,25-Hydroxy; Future  -     Vitamin B6; Future  -     Copper, Serum; Future  -     Iron; Future  -     Vitamin B1, Whole Blood; Future  -     Hemoglobin A1c; Future  -     Liraglutide (SAXENDA) 18 MG/3ML injection pen; Inject 0.6 mg under the skin into the appropriate area as directed Daily for 7 days, THEN 1.2 mg Daily for 7 days, THEN 1.8 mg Daily for 7 days, THEN 2.4 mg Daily for 7 days.  Dispense: 6 mL; Refill: 0  -     Insulin Pen Needle 32G X 4 MM misc; Dispense 1 box of disposable needles  Dispense: 100 each; Refill: 0    2. Screening for malnutrition  -     CBC & Differential; Future  -     Comprehensive Metabolic Panel; Future  -     Zinc; Future  -     Folate; Future  -     Vitamin A & E; Future  -     Vitamin B12; Future  -     Vitamin D,25-Hydroxy; Future  -     Vitamin B6; Future  -     Copper, Serum;  Future  -     Iron; Future  -     Vitamin B1, Whole Blood; Future    3. Elevated glucose  -     Hemoglobin A1c; Future    4. Prediabetes  -     Hemoglobin A1c; Future         Jade Lam was seen today for follow-up, obesity, nutrition counseling and weight loss.    Today we discussed healthy changes in lifestyle, diet, and exercise. Dietician consultation obtained.  Jade Lam had received handouts to her explaining the recommendation on portion sizes/appetite control/reading nutrition labels.   Intensive behavioral therapy for obesity was done today as well.     Goals for this month are:   Prescribed YELLOW meal plan and saxenda sent in.  Discussed risk and benefits and side effect of medication.  Also explained how to use injection.  Patient verbalizes understanding.  Lab work ordered today.  Will review with patient.  Patient will need to begin a multivitamin as well.    Follow up in 1 month for a weight recheck.    Addendum: Patient initially visited with myself Dr. Stevenson and during that time she stated she would like to use medications and not pursue surgery at this time to help with her morbid obesity.  I explained to the patient this is an option however she needs to be aware of her risks associated with taking these medications long-term.  She will be seen by our nurse practitioner today for consideration and review of these weight loss medications.  It is important to note that she did present for a previous issue with thyroid hormones.  Our APRN reviewed the risks associated with taking the GLP-1's with the patient.  Upon completion the patient decided to start this medication accordingly.  I also explained to the patient we will still be available to assist her if she wishes to pursue a surgical route.  I also explained to the patient regardless of a surgical or medical route behavior modification is paramount for continued long-term success of reversing this disease of morbid obesity.

## 2023-06-07 NOTE — ASSESSMENT & PLAN NOTE
Patient's (Body mass index is 40.39 kg/m².) indicates that they are morbidly/severely obese (BMI > 40 or > 35 with obesity - related health condition) with health conditions that include  Prediabetes  . Weight is unchanged. BMI  is above average; BMI management plan is completed. We discussed portion control and increasing exercise.

## 2023-08-14 ENCOUNTER — OFFICE VISIT (OUTPATIENT)
Dept: BARIATRICS/WEIGHT MGMT | Facility: CLINIC | Age: 38
End: 2023-08-14
Payer: COMMERCIAL

## 2023-08-14 VITALS
HEART RATE: 87 BPM | OXYGEN SATURATION: 96 % | BODY MASS INDEX: 36.89 KG/M2 | WEIGHT: 208.2 LBS | TEMPERATURE: 98 F | DIASTOLIC BLOOD PRESSURE: 82 MMHG | SYSTOLIC BLOOD PRESSURE: 117 MMHG | HEIGHT: 63 IN

## 2023-08-14 DIAGNOSIS — I10 HYPERTENSION, UNSPECIFIED TYPE: ICD-10-CM

## 2023-08-14 DIAGNOSIS — E78.2 MIXED HYPERLIPIDEMIA: ICD-10-CM

## 2023-08-14 DIAGNOSIS — E66.01 CLASS 2 SEVERE OBESITY DUE TO EXCESS CALORIES WITH SERIOUS COMORBIDITY AND BODY MASS INDEX (BMI) OF 37.0 TO 37.9 IN ADULT: Primary | ICD-10-CM

## 2023-08-14 NOTE — ASSESSMENT & PLAN NOTE
Patient's (Body mass index is 37.18 kg/mý.) indicates that they are morbidly/severely obese (BMI > 40 or > 35 with obesity - related health condition) with health conditions that include hypertension and dyslipidemias . Weight is improving with treatment. BMI  is above average; BMI management plan is completed. We discussed portion control and increasing exercise.

## 2023-08-14 NOTE — PROGRESS NOTES
"Patient Care Team:  Ryan Sams DO as PCP - General (Family Medicine)  Don Medeiros MD as Consulting Physician (Pulmonary Disease)  Domingo Witt MD as Consulting Physician (Otolaryngology)    Reason for Visit:  Medical Weight loss    Subjective   Jade Lam is a 37 y.o. female.     Jade is here for follow-up and continued medical management of her morbid obesity.  She is currently on a prescription diet.  Jade previously was to apply dietary changes such as following the meal plan as directed.  Patient admits to eating around 4 meals per day and admits to grazing in between meals.  she admits to drinking at least 64 ounces or more of fluid each day and  60 grams of protein.she is currently exercising walking 30 minutes/day  3 days/week.  she states that she has gone without soda intake and denies denies  nicotine use.  Patient has lost 11 pounds since her last appointment with us. She is not taking her multivitamin.     Review Of Systems:  Review of Systems   Constitutional: Negative.    HENT:          Dry lips and some itching    Respiratory: Negative.     Cardiovascular: Negative.    Gastrointestinal: Negative.    Endocrine: Negative.    Musculoskeletal: Negative.    Psychiatric/Behavioral: Negative.         The following portions of the patient's history were reviewed and updated as appropriate: allergies, current medications, past family history, past medical history, past social history, past surgical history, and problem list.    Objective   /82 (BP Location: Right arm, Patient Position: Sitting, Cuff Size: Adult)   Pulse 87   Temp 98 øF (36.7 øC)   Ht 159.4 cm (62.75\")   Wt 94.4 kg (208 lb 3.2 oz)   SpO2 96%   BMI 37.18 kg/mý       08/14/23  0835   Weight: 94.4 kg (208 lb 3.2 oz)       Physical Exam  Vitals reviewed.   Constitutional:       Appearance: She is obese.   Cardiovascular:      Rate and Rhythm: Normal rate and regular rhythm.   Pulmonary:      " Effort: Pulmonary effort is normal.   Abdominal:      General: Bowel sounds are normal.      Palpations: Abdomen is soft.   Musculoskeletal:         General: Normal range of motion.   Skin:     General: Skin is warm and dry.   Neurological:      Mental Status: She is alert and oriented to person, place, and time.   Psychiatric:         Mood and Affect: Mood normal.         Behavior: Behavior normal.         Assessment & Plan   Diagnoses and all orders for this visit:    1. Class 2 severe obesity due to excess calories with serious comorbidity and body mass index (BMI) of 37.0 to 37.9 in adult (Primary)  Assessment & Plan:  Patient's (Body mass index is 37.18 kg/mý.) indicates that they are morbidly/severely obese (BMI > 40 or > 35 with obesity - related health condition) with health conditions that include hypertension and dyslipidemias . Weight is improving with treatment. BMI  is above average; BMI management plan is completed. We discussed portion control and increasing exercise.       2. Mixed hyperlipidemia  Assessment & Plan:  Lipid abnormalities are unchanged.  Nutritional counseling was provided. and The patient was referred to the dietician.  Lipids will be reassessed  with PCP .      3. Hypertension, unspecified type  Comments:  WNL today.           Jade Lam was seen today for follow-up, obesity, nutrition counseling and weight loss.    Today we discussed healthy changes in lifestyle, diet, and exercise. Dietician consultation obtained.  Jade Lam had received handouts to her explaining the recommendation on portion sizes/appetite control/reading nutrition labels.   Intensive behavioral therapy for obesity was done today as well.     Goals for this month are:   Add vegetable to protein shakes. She is using [employer] pre mixed shakes but often does not add vegetables to them.   Decrease saxenda one dosage and watch for symptoms with lips. If this does not improve then we will discuss d/c  medication.       Follow up in one month for a weight recheck.A total of 20 minutes was spent face to face with this patient and over half of the time was spent on counseling and coordination of care for the disease of obesity. We specifically reviewed the dietary prescription and I made recommendations toward increasing exercise as tolerated as well as focusing on training their behavior toward storing less.

## 2023-09-01 ENCOUNTER — TELEPHONE (OUTPATIENT)
Dept: BARIATRICS/WEIGHT MGMT | Facility: CLINIC | Age: 38
End: 2023-09-01
Payer: COMMERCIAL

## 2023-09-05 DIAGNOSIS — E66.01 CLASS 3 SEVERE OBESITY DUE TO EXCESS CALORIES WITH SERIOUS COMORBIDITY AND BODY MASS INDEX (BMI) OF 40.0 TO 44.9 IN ADULT: ICD-10-CM

## 2023-09-19 ENCOUNTER — TELEPHONE (OUTPATIENT)
Dept: BARIATRICS/WEIGHT MGMT | Facility: CLINIC | Age: 38
End: 2023-09-19

## 2023-09-19 NOTE — TELEPHONE ENCOUNTER
Called patient in regard to Saxenda refill since I have been getting PA notifications but no call from her on a refill needed. She stated she hadn't received it. She also stated that she has a pen left and has decreased her dosage to 1.2ml instead of taking 3ml.... Her rx was denied and needs an appeal if med is continued. She said that no one has the dose that she needs anywhere.

## 2023-09-20 ENCOUNTER — TELEPHONE (OUTPATIENT)
Dept: BARIATRICS/WEIGHT MGMT | Facility: CLINIC | Age: 38
End: 2023-09-20
Payer: COMMERCIAL

## 2023-09-20 RX ORDER — SEMAGLUTIDE 1.7 MG/.75ML
1.7 INJECTION, SOLUTION SUBCUTANEOUS WEEKLY
Qty: 3 ML | Refills: 0 | Status: SHIPPED | OUTPATIENT
Start: 2023-09-20 | End: 2023-10-12

## 2023-09-20 NOTE — TELEPHONE ENCOUNTER
It looks like her insurance does,I have sent it in and it will need a PA. She needs to make sure she does not have a gap between her saxenda and the wegovy. Please PA and inform patient. Patient will begin wegovy the day after taking Saxenda and it will be a once a week injectable. She needs to watch the HOW TO USE video online to ensure she uses pen correctly.

## 2023-09-29 NOTE — TELEPHONE ENCOUNTER
Татьяна notified that the PA was denied and patient needed an office note showing that she was switched or something of the sort. Patient canceled her 9/19 appt and doesn't have one scheduled.

## (undated) DEVICE — TBG SMPL FLTR LINE NASL 02/C02 A/ BX/100

## (undated) DEVICE — ENDOGATOR AUXILIARY WATER JET CONNECTOR: Brand: ENDOGATOR

## (undated) DEVICE — SENSR O2 OXIMAX FNGR A/ 18IN NONSTR

## (undated) DEVICE — FRCP BX RADJAW4 NDL 2.8 240 STD OG

## (undated) DEVICE — CONMED SCOPE SAVER BITE BLOCK, 20X27 MM: Brand: SCOPE SAVER

## (undated) DEVICE — THE CHANNEL CLEANING BRUSH IS A NYLON FLEXI BRUSH ATTACHED TO A FLEXIBLE PLASTIC SHEATH DESIGNED TO SAFELY REMOVE DEBRIS FROM FLEXIBLE ENDOSCOPES.

## (undated) DEVICE — CUFF,BP,DISP,1 TUBE,ADULT,HP: Brand: MEDLINE

## (undated) DEVICE — Device: Brand: DEFENDO AIR/WATER/SUCTION AND BIOPSY VALVE